# Patient Record
Sex: FEMALE | Race: BLACK OR AFRICAN AMERICAN | NOT HISPANIC OR LATINO | Employment: FULL TIME | URBAN - METROPOLITAN AREA
[De-identification: names, ages, dates, MRNs, and addresses within clinical notes are randomized per-mention and may not be internally consistent; named-entity substitution may affect disease eponyms.]

---

## 2020-03-18 ENCOUNTER — HISTORICAL (OUTPATIENT)
Dept: RADIOLOGY | Facility: HOSPITAL | Age: 40
End: 2020-03-18

## 2021-03-22 ENCOUNTER — HISTORICAL (OUTPATIENT)
Dept: RADIOLOGY | Facility: HOSPITAL | Age: 41
End: 2021-03-22

## 2021-05-10 ENCOUNTER — HISTORICAL (OUTPATIENT)
Dept: RADIOLOGY | Facility: HOSPITAL | Age: 41
End: 2021-05-10

## 2021-06-04 ENCOUNTER — HISTORICAL (OUTPATIENT)
Dept: RADIOLOGY | Facility: HOSPITAL | Age: 41
End: 2021-06-04

## 2022-04-13 ENCOUNTER — HISTORICAL (OUTPATIENT)
Dept: RESPIRATORY THERAPY | Facility: HOSPITAL | Age: 42
End: 2022-04-13

## 2022-04-13 ENCOUNTER — HISTORICAL (OUTPATIENT)
Dept: ADMINISTRATIVE | Facility: HOSPITAL | Age: 42
End: 2022-04-13

## 2023-04-25 DIAGNOSIS — Z12.31 ENCOUNTER FOR SCREENING MAMMOGRAM FOR MALIGNANT NEOPLASM OF BREAST: Primary | ICD-10-CM

## 2023-04-26 ENCOUNTER — HOSPITAL ENCOUNTER (OUTPATIENT)
Dept: RADIOLOGY | Facility: HOSPITAL | Age: 43
Discharge: HOME | End: 2023-04-26
Attending: NURSE PRACTITIONER
Payer: MEDICAID

## 2023-04-26 DIAGNOSIS — Z12.31 ENCOUNTER FOR SCREENING MAMMOGRAM FOR MALIGNANT NEOPLASM OF BREAST: ICD-10-CM

## 2023-04-26 PROCEDURE — 77067 SCR MAMMO BI INCL CAD: CPT | Mod: TC

## 2023-04-26 PROCEDURE — 77067 SCR MAMMO BI INCL CAD: CPT | Mod: 26,,, | Performed by: RADIOLOGY

## 2023-04-26 PROCEDURE — 77067 MAMMO DIGITAL SCREENING BILAT WITH TOMO: ICD-10-PCS | Mod: 26,,, | Performed by: RADIOLOGY

## 2023-04-26 PROCEDURE — 77063 MAMMO DIGITAL SCREENING BILAT WITH TOMO: ICD-10-PCS | Mod: 26,,, | Performed by: RADIOLOGY

## 2023-04-26 PROCEDURE — 77063 BREAST TOMOSYNTHESIS BI: CPT | Mod: 26,,, | Performed by: RADIOLOGY

## 2025-04-23 ENCOUNTER — HOSPITAL ENCOUNTER (EMERGENCY)
Facility: HOSPITAL | Age: 45
Discharge: ANOTHER HOSPITAL | End: 2025-04-23
Attending: EMERGENCY MEDICINE
Payer: COMMERCIAL

## 2025-04-23 ENCOUNTER — HOSPITAL ENCOUNTER (INPATIENT)
Facility: HOSPITAL | Age: 45
LOS: 3 days | Discharge: HOME | DRG: 378 | End: 2025-04-26
Attending: INTERNAL MEDICINE | Admitting: INTERNAL MEDICINE
Payer: COMMERCIAL

## 2025-04-23 VITALS
SYSTOLIC BLOOD PRESSURE: 138 MMHG | BODY MASS INDEX: 51.83 KG/M2 | DIASTOLIC BLOOD PRESSURE: 90 MMHG | WEIGHT: 264 LBS | TEMPERATURE: 99 F | OXYGEN SATURATION: 99 % | HEIGHT: 60 IN | HEART RATE: 94 BPM | RESPIRATION RATE: 22 BRPM

## 2025-04-23 DIAGNOSIS — R06.09 DYSPNEA ON EXERTION: ICD-10-CM

## 2025-04-23 DIAGNOSIS — K92.2 GIB (GASTROINTESTINAL BLEEDING): ICD-10-CM

## 2025-04-23 DIAGNOSIS — K92.2 UGIB (UPPER GASTROINTESTINAL BLEED): Primary | ICD-10-CM

## 2025-04-23 DIAGNOSIS — D64.9 SYMPTOMATIC ANEMIA: ICD-10-CM

## 2025-04-23 DIAGNOSIS — R07.9 CHEST PAIN: ICD-10-CM

## 2025-04-23 LAB
ABO + RH BLD: NORMAL
ABO + RH BLD: NORMAL
ALBUMIN SERPL-MCNC: 3 G/DL (ref 3.5–5)
ALBUMIN/GLOB SERPL: 0.9 RATIO (ref 1.1–2)
ALP SERPL-CCNC: 85 UNIT/L (ref 40–150)
ALT SERPL-CCNC: 21 UNIT/L (ref 0–55)
ANION GAP SERPL CALC-SCNC: 4 MEQ/L
AST SERPL-CCNC: 15 UNIT/L (ref 11–45)
BASOPHILS # BLD AUTO: 0.03 X10(3)/MCL
BASOPHILS NFR BLD AUTO: 0.3 %
BILIRUB SERPL-MCNC: 0.2 MG/DL
BLD PROD TYP BPU: NORMAL
BLD PROD TYP BPU: NORMAL
BLOOD UNIT EXPIRATION DATE: NORMAL
BLOOD UNIT EXPIRATION DATE: NORMAL
BLOOD UNIT TYPE CODE: 9500
BLOOD UNIT TYPE CODE: 9500
BUN SERPL-MCNC: 19.5 MG/DL (ref 7–18.7)
CALCIUM SERPL-MCNC: 8.7 MG/DL (ref 8.4–10.2)
CHLORIDE SERPL-SCNC: 107 MMOL/L (ref 98–107)
CO2 SERPL-SCNC: 28 MMOL/L (ref 22–29)
CREAT SERPL-MCNC: 0.86 MG/DL (ref 0.55–1.02)
CREAT/UREA NIT SERPL: 23
CROSSMATCH INTERPRETATION: NORMAL
CROSSMATCH INTERPRETATION: NORMAL
D DIMER PPP IA.FEU-MCNC: 0.19 UG/ML FEU (ref 0–0.5)
DISPENSE STATUS: NORMAL
DISPENSE STATUS: NORMAL
EOSINOPHIL # BLD AUTO: 0.22 X10(3)/MCL (ref 0–0.9)
EOSINOPHIL NFR BLD AUTO: 2 %
ERYTHROCYTE [DISTWIDTH] IN BLOOD BY AUTOMATED COUNT: 17 % (ref 11.5–17)
FERRITIN SERPL-MCNC: 83.4 NG/ML (ref 4.63–204)
FOLATE SERPL-MCNC: 10.6 NG/ML (ref 7–31.4)
GFR SERPLBLD CREATININE-BSD FMLA CKD-EPI: >60 ML/MIN/1.73/M2
GLOBULIN SER-MCNC: 3.4 GM/DL (ref 2.4–3.5)
GLUCOSE SERPL-MCNC: 119 MG/DL (ref 74–100)
GROUP & RH: NORMAL
GROUP & RH: NORMAL
HCT VFR BLD AUTO: 18.5 % (ref 37–47)
HEMOCCULT SP1 STL QL: POSITIVE
HGB BLD-MCNC: 5.6 G/DL (ref 12–16)
IMM GRANULOCYTES # BLD AUTO: 0.13 X10(3)/MCL (ref 0–0.04)
IMM GRANULOCYTES NFR BLD AUTO: 1.2 %
INDIRECT COOMBS: NORMAL
INDIRECT COOMBS: NORMAL
INR PPP: 1
IRON SATN MFR SERPL: 11 % (ref 20–50)
IRON SERPL-MCNC: 30 UG/DL (ref 50–170)
LYMPHOCYTES # BLD AUTO: 2.84 X10(3)/MCL (ref 0.6–4.6)
LYMPHOCYTES NFR BLD AUTO: 25.3 %
MCH RBC QN AUTO: 26.7 PG (ref 27–31)
MCHC RBC AUTO-ENTMCNC: 30.3 G/DL (ref 33–36)
MCV RBC AUTO: 88.1 FL (ref 80–94)
MONOCYTES # BLD AUTO: 0.5 X10(3)/MCL (ref 0.1–1.3)
MONOCYTES NFR BLD AUTO: 4.5 %
NEUTROPHILS # BLD AUTO: 7.51 X10(3)/MCL (ref 2.1–9.2)
NEUTROPHILS NFR BLD AUTO: 66.7 %
PLATELET # BLD AUTO: 379 X10(3)/MCL (ref 130–400)
PMV BLD AUTO: 10.2 FL (ref 7.4–10.4)
POTASSIUM SERPL-SCNC: 3.7 MMOL/L (ref 3.5–5.1)
PROT SERPL-MCNC: 6.4 GM/DL (ref 6.4–8.3)
PROTHROMBIN TIME: <10 SECONDS (ref 12.5–14.5)
RBC # BLD AUTO: 2.1 X10(6)/MCL (ref 4.2–5.4)
SODIUM SERPL-SCNC: 139 MMOL/L (ref 136–145)
SPECIMEN OUTDATE: NORMAL
SPECIMEN OUTDATE: NORMAL
TIBC SERPL-MCNC: 248 UG/DL (ref 70–310)
TIBC SERPL-MCNC: 278 UG/DL (ref 250–450)
TRANSFERRIN SERPL-MCNC: 248 MG/DL (ref 180–382)
TROPONIN I SERPL-MCNC: <0.01 NG/ML (ref 0–0.04)
TROPONIN I SERPL-MCNC: <0.01 NG/ML (ref 0–0.04)
UNIT NUMBER: NORMAL
UNIT NUMBER: NORMAL
VIT B12 SERPL-MCNC: 439 PG/ML (ref 213–816)
WBC # BLD AUTO: 11.23 X10(3)/MCL (ref 4.5–11.5)

## 2025-04-23 PROCEDURE — 93005 ELECTROCARDIOGRAM TRACING: CPT

## 2025-04-23 PROCEDURE — 82272 OCCULT BLD FECES 1-3 TESTS: CPT | Performed by: EMERGENCY MEDICINE

## 2025-04-23 PROCEDURE — 36415 COLL VENOUS BLD VENIPUNCTURE: CPT

## 2025-04-23 PROCEDURE — 83550 IRON BINDING TEST: CPT | Performed by: EMERGENCY MEDICINE

## 2025-04-23 PROCEDURE — 93010 ELECTROCARDIOGRAM REPORT: CPT | Mod: ,,, | Performed by: INTERNAL MEDICINE

## 2025-04-23 PROCEDURE — 86850 RBC ANTIBODY SCREEN: CPT | Mod: 91

## 2025-04-23 PROCEDURE — 85379 FIBRIN DEGRADATION QUANT: CPT | Performed by: EMERGENCY MEDICINE

## 2025-04-23 PROCEDURE — 85610 PROTHROMBIN TIME: CPT | Performed by: EMERGENCY MEDICINE

## 2025-04-23 PROCEDURE — 80053 COMPREHEN METABOLIC PANEL: CPT | Performed by: EMERGENCY MEDICINE

## 2025-04-23 PROCEDURE — 36430 TRANSFUSION BLD/BLD COMPNT: CPT

## 2025-04-23 PROCEDURE — 63600175 PHARM REV CODE 636 W HCPCS: Performed by: EMERGENCY MEDICINE

## 2025-04-23 PROCEDURE — 96374 THER/PROPH/DIAG INJ IV PUSH: CPT

## 2025-04-23 PROCEDURE — 86900 BLOOD TYPING SEROLOGIC ABO: CPT | Performed by: EMERGENCY MEDICINE

## 2025-04-23 PROCEDURE — P9016 RBC LEUKOCYTES REDUCED: HCPCS | Performed by: EMERGENCY MEDICINE

## 2025-04-23 PROCEDURE — 82607 VITAMIN B-12: CPT | Performed by: EMERGENCY MEDICINE

## 2025-04-23 PROCEDURE — 85025 COMPLETE CBC W/AUTO DIFF WBC: CPT | Performed by: EMERGENCY MEDICINE

## 2025-04-23 PROCEDURE — 25000003 PHARM REV CODE 250: Performed by: EMERGENCY MEDICINE

## 2025-04-23 PROCEDURE — 99285 EMERGENCY DEPT VISIT HI MDM: CPT | Mod: 25

## 2025-04-23 PROCEDURE — 63600175 PHARM REV CODE 636 W HCPCS

## 2025-04-23 PROCEDURE — 84484 ASSAY OF TROPONIN QUANT: CPT | Performed by: EMERGENCY MEDICINE

## 2025-04-23 PROCEDURE — 82728 ASSAY OF FERRITIN: CPT | Performed by: EMERGENCY MEDICINE

## 2025-04-23 PROCEDURE — 86923 COMPATIBILITY TEST ELECTRIC: CPT | Mod: 91 | Performed by: EMERGENCY MEDICINE

## 2025-04-23 PROCEDURE — 93010 ELECTROCARDIOGRAM REPORT: CPT | Mod: 76,,, | Performed by: INTERNAL MEDICINE

## 2025-04-23 PROCEDURE — 84484 ASSAY OF TROPONIN QUANT: CPT

## 2025-04-23 PROCEDURE — 11000001 HC ACUTE MED/SURG PRIVATE ROOM

## 2025-04-23 PROCEDURE — 82746 ASSAY OF FOLIC ACID SERUM: CPT | Performed by: EMERGENCY MEDICINE

## 2025-04-23 RX ORDER — ACETAMINOPHEN 500 MG
1000 TABLET ORAL
Status: COMPLETED | OUTPATIENT
Start: 2025-04-23 | End: 2025-04-23

## 2025-04-23 RX ORDER — PANTOPRAZOLE SODIUM 40 MG/10ML
80 INJECTION, POWDER, LYOPHILIZED, FOR SOLUTION INTRAVENOUS
Status: COMPLETED | OUTPATIENT
Start: 2025-04-23 | End: 2025-04-23

## 2025-04-23 RX ORDER — ALUMINUM HYDROXIDE, MAGNESIUM HYDROXIDE, AND SIMETHICONE 1200; 120; 1200 MG/30ML; MG/30ML; MG/30ML
30 SUSPENSION ORAL 4 TIMES DAILY PRN
Status: DISCONTINUED | OUTPATIENT
Start: 2025-04-23 | End: 2025-04-26 | Stop reason: HOSPADM

## 2025-04-23 RX ORDER — TALC
6 POWDER (GRAM) TOPICAL NIGHTLY PRN
Status: DISCONTINUED | OUTPATIENT
Start: 2025-04-23 | End: 2025-04-26 | Stop reason: HOSPADM

## 2025-04-23 RX ORDER — SODIUM CHLORIDE 0.9 % (FLUSH) 0.9 %
10 SYRINGE (ML) INJECTION
Status: DISCONTINUED | OUTPATIENT
Start: 2025-04-23 | End: 2025-04-26 | Stop reason: HOSPADM

## 2025-04-23 RX ORDER — PANTOPRAZOLE SODIUM 40 MG/10ML
40 INJECTION, POWDER, LYOPHILIZED, FOR SOLUTION INTRAVENOUS 2 TIMES DAILY
Status: DISCONTINUED | OUTPATIENT
Start: 2025-04-24 | End: 2025-04-26 | Stop reason: HOSPADM

## 2025-04-23 RX ORDER — NEBIVOLOL 2.5 MG/1
10 TABLET ORAL DAILY
COMMUNITY

## 2025-04-23 RX ORDER — IBUPROFEN 200 MG
24 TABLET ORAL
Status: DISCONTINUED | OUTPATIENT
Start: 2025-04-23 | End: 2025-04-26 | Stop reason: HOSPADM

## 2025-04-23 RX ORDER — ASPIRIN 325 MG
325 TABLET ORAL
Status: COMPLETED | OUTPATIENT
Start: 2025-04-23 | End: 2025-04-23

## 2025-04-23 RX ORDER — HYDROCODONE BITARTRATE AND ACETAMINOPHEN 500; 5 MG/1; MG/1
TABLET ORAL
Status: DISCONTINUED | OUTPATIENT
Start: 2025-04-23 | End: 2025-04-23 | Stop reason: HOSPADM

## 2025-04-23 RX ORDER — ACETAMINOPHEN 325 MG/1
650 TABLET ORAL EVERY 4 HOURS PRN
Status: DISCONTINUED | OUTPATIENT
Start: 2025-04-23 | End: 2025-04-26 | Stop reason: HOSPADM

## 2025-04-23 RX ORDER — LOSARTAN POTASSIUM 25 MG/1
12.5 TABLET ORAL DAILY
COMMUNITY

## 2025-04-23 RX ORDER — DIPHENHYDRAMINE HCL 25 MG
50 CAPSULE ORAL
Status: COMPLETED | OUTPATIENT
Start: 2025-04-23 | End: 2025-04-23

## 2025-04-23 RX ORDER — POLYETHYLENE GLYCOL 3350 17 G/17G
17 POWDER, FOR SOLUTION ORAL 2 TIMES DAILY PRN
Status: DISCONTINUED | OUTPATIENT
Start: 2025-04-23 | End: 2025-04-26 | Stop reason: HOSPADM

## 2025-04-23 RX ORDER — GLUCAGON 1 MG
1 KIT INJECTION
Status: DISCONTINUED | OUTPATIENT
Start: 2025-04-23 | End: 2025-04-26 | Stop reason: HOSPADM

## 2025-04-23 RX ORDER — AMLODIPINE BESYLATE 10 MG/1
10 TABLET ORAL DAILY
COMMUNITY

## 2025-04-23 RX ORDER — IBUPROFEN 200 MG
16 TABLET ORAL
Status: DISCONTINUED | OUTPATIENT
Start: 2025-04-23 | End: 2025-04-26 | Stop reason: HOSPADM

## 2025-04-23 RX ORDER — BISACODYL 10 MG/1
10 SUPPOSITORY RECTAL DAILY PRN
Status: DISCONTINUED | OUTPATIENT
Start: 2025-04-23 | End: 2025-04-26 | Stop reason: HOSPADM

## 2025-04-23 RX ORDER — FAMOTIDINE 10 MG/ML
20 INJECTION, SOLUTION INTRAVENOUS ONCE
Status: COMPLETED | OUTPATIENT
Start: 2025-04-23 | End: 2025-04-23

## 2025-04-23 RX ADMIN — ASPIRIN 325 MG ORAL TABLET 325 MG: 325 PILL ORAL at 09:04

## 2025-04-23 RX ADMIN — SODIUM CHLORIDE: 9 INJECTION, SOLUTION INTRAVENOUS at 12:04

## 2025-04-23 RX ADMIN — DIPHENHYDRAMINE HYDROCHLORIDE 50 MG: 25 CAPSULE ORAL at 10:04

## 2025-04-23 RX ADMIN — PANTOPRAZOLE SODIUM 80 MG: 40 INJECTION, POWDER, LYOPHILIZED, FOR SOLUTION INTRAVENOUS at 10:04

## 2025-04-23 RX ADMIN — FAMOTIDINE 20 MG: 10 INJECTION, SOLUTION INTRAVENOUS at 09:04

## 2025-04-23 RX ADMIN — ACETAMINOPHEN 1000 MG: 500 TABLET ORAL at 10:04

## 2025-04-23 NOTE — ED NOTES
Pt reports no s/s of transfusion reaction, denies urticaria, HA, sob, lower back pain, increased temp. Rate changed to 150mL/hr

## 2025-04-23 NOTE — ED NOTES
2nd unit of blood transfusion complete, pt denies any s/s of blood transfusion reaction, awaiting [facility] for tranport to [facility].

## 2025-04-23 NOTE — ED NOTES
2nd unit of Blood started at 75ml/hr. Pt in no distress, pt sitting up in bed talking on phone, vitals stable.

## 2025-04-23 NOTE — ED NOTES
Pt notified of critical H/H, after further questioning pt admitted to having dark tarry stools that started 1 week ago, MD made aware.

## 2025-04-23 NOTE — ED PROVIDER NOTES
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 The patient reports a two-week history of progressively worsening dyspnea, particularly during activities.    The patient states that her shortness of breath has significantly worsened over the past week, with yesterday being particularly bad. She used nebulized treatments, which provided temporary relief. This morning, she found it difficult to walk due to breathlessness. The patient describes this as the most severe shortness of breath she has ever experienced. She also reports occasional chest pain with pressure, which she distinguishes from her usual asthma symptoms.    The patient mentions having a bad backache two weeks ago, which preceded the onset of her current respiratory symptoms. She notes that her shortness of breath is now present even at rest and has worsened this week. The patient also reports possible recent cold or COVID-like symptoms, which may have exacerbated her respiratory issues.    The patient reports a change in stool character over the past week, with dark-colored stools that are not typical of her normal pattern. She denies recent iron or Pepto-Bismol ingestion and has no associated abdominal pain.    The patient's medical history is  "significant for multiple surgeries, including  sections, ectopic pregnancy, hysterectomy, cholecystectomy, and tonsillectomy with adenoidectomy. She has a family history of cardiovascular disease, noting that her brother had a heart attack four years ago, initially misdiagnosed as acid reflux.    The patient reports being 5'7" and weighing 260 lbs, with slight weight fluctuations.  she has a history of hypertension and takes losartan, Bystolic and amlodipine. She has been diagnosed with sleep apnea but admits to not using her CPAP regularly for several months.    The patient denies any rectal bleeding or hemoptysis.   Recent lifestyle factors include a busy schedule with graduation and prom activities, as well as driving family members around, which may be contributing to her fatigue and symptoms.          Review of patient's allergies indicates:  No Known Allergies  No past medical history on file.  No past surgical history on file.  No family history on file.  Social History[1]  Review of Systems   Constitutional:  Positive for fatigue. Negative for fever.   HENT: Negative.     Respiratory:  Positive for shortness of breath. Negative for wheezing.    Cardiovascular:  Positive for chest pain.   Gastrointestinal:  Negative for abdominal pain and vomiting.        Dark stool   Endocrine: Negative.    Genitourinary: Negative.    Musculoskeletal: Negative.    Skin: Negative.    Neurological:  Positive for weakness and headaches. Negative for syncope.   Hematological: Negative.    Psychiatric/Behavioral: Negative.     All other systems reviewed and are negative.      Physical Exam     Initial Vitals [25 0808]   BP Pulse Resp Temp SpO2   (!) 153/90 96 18 97.9 °F (36.6 °C) 97 %      MAP       --         Physical Exam    Nursing note and vitals reviewed.  Constitutional: She appears well-developed and well-nourished. No distress.   HENT:   Head: Normocephalic and atraumatic.   Eyes: EOM are normal. Pupils are " equal, round, and reactive to light.   Pale conjunctiva   Neck: Trachea normal. Neck supple.    Full passive range of motion without pain.     Cardiovascular:  Normal rate, regular rhythm and normal pulses.           Poor capillary refill   Pulmonary/Chest: Breath sounds normal.   Abdominal: Abdomen is soft.   Musculoskeletal:      Cervical back: Full passive range of motion without pain and neck supple.      Comments: No deformity, Nl ROM     Lymphadenopathy:     She has no cervical adenopathy.   Neurological: She is alert and oriented to person, place, and time. She has normal strength. GCS eye subscore is 4. GCS verbal subscore is 5. GCS motor subscore is 6.   Skin: Skin is warm and intact. Capillary refill takes less than 2 seconds.   Psychiatric: She is not actively hallucinating. She expresses no homicidal and no suicidal ideation.         ED Course   Critical Care    Date/Time: 4/23/2025 11:05 AM    Performed by: Yfn Keenan MD  Authorized by: Yfn Keenan MD  Total critical care time (exclusive of procedural time) : 0 minutes  Comments: Total critical care time is 45 minutes.     Total critical care time documented does not include time spent on separately billed procedures or the services of nurses, or mid-level providers.     I personally saw and examined the patient. I have reviewed all diagnostic interpretations and treatment plans as written. I was present for the key portions of any procedures performed and the inclusive time noted in any critical care statement. Critical care time includes patient management by me, time spent at the patient's bedside, time to review lab and imaging results, discussing patient care, documentation in the medical record, and time spent with the family or caregiver.    The high probability of sudden, clinically significant deterioration in the patient's condition required the highest level of preparedness to intervene urgently.          Labs Reviewed    COMPREHENSIVE METABOLIC PANEL - Abnormal       Result Value    Sodium 139      Potassium 3.7      Chloride 107      CO2 28      Glucose 119 (*)     Blood Urea Nitrogen 19.5 (*)     Creatinine 0.86      Calcium 8.7      Protein Total 6.4      Albumin 3.0 (*)     Globulin 3.4      Albumin/Globulin Ratio 0.9 (*)     Bilirubin Total 0.2      ALP 85      ALT 21      AST 15      eGFR >60      Anion Gap 4.0      BUN/Creatinine Ratio 23     PROTIME-INR - Abnormal    PT <10.0 (*)     INR 1.0      Narrative:     Protimes are used to monitor anticoagulant agents such as warfarin. PT INR values are based on the current patient normal mean and the CHANDRAKANT value for the specific instrument reagent used.  **Routine theraputic target values for the INR are 2.0-3.0**   CBC WITH DIFFERENTIAL - Abnormal    WBC 11.23      RBC 2.10 (*)     Hgb 5.6 (*)     Hct 18.5 (*)     MCV 88.1      MCH 26.7 (*)     MCHC 30.3 (*)     RDW 17.0      Platelet 379      MPV 10.2      Neut % 66.7      Lymph % 25.3      Mono % 4.5      Eos % 2.0      Basophil % 0.3      Imm Grans % 1.2      Neut # 7.51      Lymph # 2.84      Mono # 0.50      Eos # 0.22      Baso # 0.03      Imm Gran # 0.13 (*)    OCCULT BLOOD, STOOL 1ST SPECIMEN - Abnormal    Occult Blood Stool 1 Positive (*)    IRON AND TIBC - Abnormal    Iron Binding Capacity Unsaturated 248      Iron Level 30 (*)     Transferrin 248      Iron Binding Capacity Total 278      Iron Saturation 11 (*)    TROPONIN I - Normal    Troponin-I <0.010     D DIMER, QUANTITATIVE - Normal    D-Dimer 0.19     FERRITIN - Normal    Ferritin Level 83.40     VITAMIN B12 - Normal    Vitamin B12 439     FOLATE - Normal    Folate Level 10.6     CBC W/ AUTO DIFFERENTIAL    Narrative:     The following orders were created for panel order CBC auto differential.  Procedure                               Abnormality         Status                     ---------                               -----------         ------                      CBC with Differential[[id number]]        Abnormal            Final result                 Please view results for these tests on the individual orders.   TYPE & SCREEN    Group & Rh O NEG      Indirect Abhinav GEL NEG      Specimen Outdate 04/26/2025 23:59     PREPARE RBC SOFT    UNIT NUMBER [id number]      UNIT ABO/RH O NEG      DISPENSE STATUS Transfused      Unit Expiration [id number]      Product Code F2952V43      Unit Blood Type Code 9500      CROSSMATCH INTERPRETATION Compatible      UNIT NUMBER [id number]      UNIT ABO/RH O NEG      DISPENSE STATUS Transfused      Unit Expiration [id number]      Product Code Y2740J93      Unit Blood Type Code 9500      CROSSMATCH INTERPRETATION Compatible       EKG Readings: (Independently Interpreted)   Rhythm: Sinus Tachycardia. Ectopy: No Ectopy. Conduction: Normal. ST Segments: Normal ST Segments. T Waves: Normal. Clinical Impression: Normal Sinus Rhythm     ECG Results              EKG 12-lead (Final result)        Collection Time Result Time QRS Duration OHS QTC Calculation    04/23/25 08:03:40 04/24/25 12:39:46 84 457                     Final result by Interface, Lab In [facility] (04/24/25 12:39:52)                   Narrative:    Test Reason : R07.9,    Vent. Rate : 104 BPM     Atrial Rate : 104 BPM     P-R Int : 158 ms          QRS Dur :  84 ms      QT Int : 348 ms       P-R-T Axes :  37  69  34 degrees    QTcB Int : 457 ms    Sinus tachycardia  Otherwise normal ECG  When compared with ECG of 23-Apr-2025 08:02,  MANUAL COMPARISON REQUIRED PREVIOUS ECG IS INCOMPATIBLE  Confirmed by Portillo Person ([id number]) on 4/24/2025 12:39:45 PM    Referred By: AAAREFERRAL SELF           Confirmed By: Portillo Person                                  Imaging Results              X-Ray Chest AP Portable (Final result)  Result time 04/23/25 09:42:40      Final result by Andrzej Becker MD (04/23/25 09:42:40)                   Impression:      No acute findings.      Electronically  signed by: Andrzej Becker  Date:    04/23/2025  Time:    09:42               Narrative:    EXAMINATION:  XR CHEST AP PORTABLE    CLINICAL HISTORY:  Chest Pain;    COMPARISON:  16 September 2021    FINDINGS:  Frontal view of the chest was obtained. Heart is mildly enlarged.  Grossly clear lungs.  No pneumothorax.                                       Medications   aspirin tablet 325 mg (325 mg Oral Given 4/23/25 0934)   acetaminophen tablet 1,000 mg (1,000 mg Oral Given 4/23/25 1042)   diphenhydrAMINE capsule 50 mg (50 mg Oral Given 4/23/25 1042)   pantoprazole injection 80 mg (80 mg Intravenous Given 4/23/25 1059)     Medical Decision Making  Medical Decision Making  45-year-old female with a history of asthma, sleep apnea, and hypertension presenting with worsening shortness of breath for two weeks and occasional chest pressure. The patient's presentation with exertional dyspnea, chest pressure, and recent worsening of symptoms raises concern for symptomatic anemia with potential gastrointestinal etiology as the primary differential diagnosis. Given the family history of misdiagnosed heart attack, cardiac causes must still be thoroughly evaluated. The reported pallor and possibility of anemia are now considered the most likely underlying condition. Differential diagnoses now include:  - Symptomatic anemia with potential gastrointestinal etiology (primary concern)  - Acute coronary syndrome from obstructive coronary disease (high risk due to obesity and hypertension)  - Pulmonary embolism  - Exacerbation of asthma    Lab work, including cardiac enzymes, D-dimer, and complete blood count, along with possible CT angiography, will help differentiate between these conditions. A cardiology consult via telecardiology is planned to assist with disposition based on lab results and clinical presentation. The patient's recent possible viral illness (cold or COVID) could explain the worsening respiratory symptoms, but more  serious causes must be ruled out first. The decision to repeat cardiac enzymes in 2-3 hours reflects the need to monitor for evolving cardiac events.    Acute dyspnea with chest pain  Assessment: 45-year-old female with history of asthma presenting with acute worsening of dyspnea over the past two weeks, particularly exacerbated in the last 24 hours. Patient reports dyspnea at rest and with exertion, accompanied by occasional chest pressure. Symptoms are described as different from typical asthma exacerbations. Recent history of backache two weeks ago, followed by respiratory symptom progression. Patient has multiple risk factors for cardiopulmonary disease, including obesity (BMI ~40.7), hypertension, sleep apnea with poor CPAP compliance, and family history of premature coronary artery disease. Differential diagnoses include symptomatic anemia with potential GI etiology as primary concern, acute coronary syndrome from obstructive coronary disease (high risk), pulmonary embolism, heart failure exacerbation, and severe asthma exacerbation.  Plan:  - Obtain laboratory studies including cardiac enzymes, D-dimer, complete blood count  - Repeat cardiac enzymes in 2-3 hours  - Perform CT angiography if indicated based on D-dimer results  -  possible Cardiology consultation via telecardiology for disposition decision    Possible Symptomatic Anemia  Assessment: Patient presents with pallor of the eyes and conjunctiva, with poor capillary refill of nail beds, raising strong suspicion for symptomatic anemia. Patient reports change in stool character over past week, with dark-colored stools not typical of her normal pattern. No recent iron or Pepto-Bismol ingestion. No associated abdominal pain.  Plan:  - Comprehensive anemia workup including CBC, iron studies, and potential GI evaluation  - Assess for potential occult bleeding source, performed digital rectal exam    Hypertension  Assessment: Patient reports blood pressure  "of 130/80 mmHg, currently managed with three antihypertensive medications including Bystolic (taken at night).  Plan:  - Continue current antihypertensive regimen, including Bystolic at night    Obesity  Assessment: Patient reports height of 5'7" and weight of 260 lbs, corresponding to a BMI of approximately 40.7, indicating class III obesity.    Sleep apnea  Assessment: Patient has diagnosed sleep apnea but reports poor compliance with CPAP therapy, not using the device regularly for several months.    Amount and/or Complexity of Data Reviewed  Labs: ordered.  Radiology: ordered.    Risk  OTC drugs.  Prescription drug management.      Additional MDM:   Heart Score:    History:          Slightly suspicious.  ECG:             Normal  Age:               45-65 years  Risk factors: >= 3 risk factors or history of atherosclerotic disease  Troponin:       Less than or equal to normal limit  Heart Score = 3          NICOLASA Score:   Age over 65:                                    0.00   > or = to 3 CAD risk factors:           1.00  Established CAD:                            0.00  > or = to 2 anginal events in the past 24 hours: 1.00  Use of ASA in past 7 days:              0.00  Elevated Enzymes:                         0.00  ST Depression > or = to 0.05 mV:  0.00  NICOLASA score = 2                  10:35 a.m.    Medical Decision Making  Patient presents with chest pain and severe anemia. Cardiac enzymes are within normal limits, ruling out acute coronary syndrome. The etiology of chest pain is identified as severe anemia, with CBC revealing a hemoglobin of 5.6 and hematocrit of 18.5, consistent with physical exam findings. Digital rectal exam is heme positive, indicating gastrointestinal bleeding. Low total iron and iron saturation further support the diagnosis of iron deficiency anemia. CT angiogram was initially considered but cancelled due to the identified etiology. Cardiology consultation was deemed unnecessary given the " clear cause of symptoms. The severity of anemia and presence of GI bleeding necessitate immediate intervention with blood transfusion for symptomatic anemia and further workup for upper GI bleeding.    Severe Symptomatic Anemia  Assessment: Patient presents with severe symptomatic anemia, confirmed by CBC revealing hemoglobin of 5.6 g/dL and hematocrit of 18.5%. Physical exam findings are consistent with these lab results. Digital rectal exam is heme positive, indicating gastrointestinal bleeding. Low total iron and iron saturation levels are noted. Cardiac enzymes are within normal limits, ruling out cardiac etiology for the patient's symptoms.  Plan:  - Transfuse patient for symptomatic anemia  - Transfer to a higher level of care for gastrointestinal workup  - Perform upper GI endoscopy after transfer          Total critical care time is 45 minutes.      Total critical care time documented does not include time spent on separately billed procedures or the services of nurses, or mid-level providers.      I personally saw and examined the patient. I have reviewed all diagnostic interpretations and treatment plans as written. I was present for the key portions of any procedures performed and the inclusive time noted in any critical care statement. Critical care time includes patient management by me, time spent at the patient's bedside, time to review lab and imaging results, discussing patient care, documentation in the medical record, and time spent with the family or caregiver.     The high probability of sudden, clinically significant deterioration in the patient's condition required the highest level of preparedness to intervene urgently.           Clinical Impression:  Final diagnoses:  [R07.9] Chest pain  [K92.2] UGIB (upper gastrointestinal bleed) (Primary)  [D64.9] Symptomatic anemia  [R06.09] Dyspnea on exertion          ED Disposition Condition    Transfer to Another Facility Fair                    [1]          Yfn Keenan MD  04/25/25 0599

## 2025-04-23 NOTE — PROVIDER TRANSFER
Outside Transfer Acceptance Note / Regional Referral Center    Referring facility: [facility]   Referring provider: TAVIA LUGO  Accepting facility: [facility]  Accepting provider: CARMITA LIMA SHAMEEM  Admitting provider: Raul  Reason for transfer: Higher Level of Care   Transfer diagnosis: Acute upper GI bleeding  Transfer specialty requested: Gastroenterology  Transfer specialty notified: No  Transfer level: NUMBER 1-5: 2  Bed type requested: med/surg  Isolation status: No active isolations   Admission class or status: IP- Inpatient      Narrative     45-year-old female currently at [facility] emergency room. Requesting transfer for gastroenterology services. Patient originally reported with complaint of chest pain and dyspnea. Reports decreased exercise tolerance. History of asthma, sleep apnea, and hypertension. In the emergency room she is noted to be mildly hypertensive but overall stable. Mild tachypnea but stable on room air. Noted to be pale on exam. Hemoglobin returned at 5.6. Electrolytes and renal function are stable. Iron level 30 with saturation of 11%. Transfusing 2 units of packed red blood cells in the emergency room. Started on IV Protonix. EKG sinus rhythm without ST-T changes. Troponin negative. Chest x-ray clear. Digital rectal exam was heme positive. Will be transferred under hospitalist services     Objective     Vitals:    Recent Labs: All pertinent labs within the past 24 hours have been reviewed.  Recent Lab Results         04/23/25  1025   04/23/25  0959   04/23/25  0902        Unit Blood Type Code 9500  [P]            9500  [P]           Unit Expiration [id number]  [P]            [id number]  [P]           Albumin/Globulin Ratio     0.9       Albumin     3.0       ALP     85       ALT     21       Anion Gap     4.0       AST     15       Baso #     0.03       Basophil %     0.3       BILIRUBIN TOTAL     0.2        BUN     19.5       BUN/CREAT RATIO     23       Calcium     8.7       Chloride     107       CO2     28       Creatinine     0.86       Crossmatch Interpretation Compatible  [P]            Compatible  [P]           D-Dimer 0.19  Comment: D-dimer values of less than 0.5ug/mL (mg/L) FEU in adult patients with a clinically low pre-test probability of developing a DVT yield  a 99% negative predictive value.  D-dimer increases naturally with age, therefore the negative predictive value in patients older than 80 is 21 - 31%.    D-Dimer testing is used as an aid in the diagnosis of VTE/PE. The D-Dimer test must be used with one or more additional tests such as imaging studies to evaluate VTE/PE           DISPENSE STATUS Selected  [P]            Issued  [P]           eGFR     >60  Comment: Estimated GFR calculated using the CKD-EPI creatinine (2021) equation.       Eos #     0.22       Eos %     2.0       Ferritin     83.40       Folate 10.6           Globulin, Total     3.4       Glucose     119       Group & Rh O NEG           Hematocrit     18.5       Hemoglobin     5.6       Immature Grans (Abs)     0.13       Immature Granulocytes     1.2       Indirect Abhinav GEL NEG           INR     1.0       Iron     30       Iron Saturation     11       Lymph #     2.84       LYMPH %     25.3       MCH     26.7       MCHC     30.3       MCV     88.1       Mono #     0.50       Mono %     4.5       MPV     10.2       Neut #     7.51       Neut %     66.7       Occult Blood   Positive         Platelet Count     379       Potassium     3.7       Product Code S5501L66  [P]            [id number]  [P]           PROTEIN TOTAL     6.4       PT     <10.0       RBC     2.10       RDW     17.0       Sodium     139       Specimen Outdate 04/26/2025 23:59           TIBC     278       Transferrin     248       Troponin I     <0.010       UIBC     248       Unit ABO/Rh O NEG  [P]            O NEG  [P]           UNIT NUMBER [id number]  [P]             [id number]  [P]           Vitamin B12     439       WBC     11.23                [P] - Preliminary Result             Recent imaging:   Airway:     Vent settings:         IV access:        Peripheral IV - Single Lumen 04/23/25 1046 20 G Anterior;Proximal;Right Forearm (Active)     Infusions: none  Allergies: Review of patient's allergies indicates:  No Known Allergies   NPO: Yes    Anticoagulation:   Anticoagulants       None               Ziggy Slater MD  04/23/2025 12:39 PM

## 2025-04-24 LAB
ALBUMIN SERPL-MCNC: 3.1 G/DL (ref 3.5–5)
ALBUMIN/GLOB SERPL: 1 RATIO (ref 1.1–2)
ALP SERPL-CCNC: 85 UNIT/L (ref 40–150)
ALT SERPL-CCNC: 21 UNIT/L (ref 0–55)
ANION GAP SERPL CALC-SCNC: 7 MEQ/L
AST SERPL-CCNC: 22 UNIT/L (ref 11–45)
BASOPHILS # BLD AUTO: 0.05 X10(3)/MCL
BASOPHILS NFR BLD AUTO: 0.6 %
BILIRUB SERPL-MCNC: 0.5 MG/DL
BUN SERPL-MCNC: 15.1 MG/DL (ref 7–18.7)
CALCIUM SERPL-MCNC: 8.1 MG/DL (ref 8.4–10.2)
CHLORIDE SERPL-SCNC: 105 MMOL/L (ref 98–107)
CO2 SERPL-SCNC: 25 MMOL/L (ref 22–29)
CREAT SERPL-MCNC: 1.01 MG/DL (ref 0.55–1.02)
CREAT/UREA NIT SERPL: 15
EOSINOPHIL # BLD AUTO: 0.19 X10(3)/MCL (ref 0–0.9)
EOSINOPHIL NFR BLD AUTO: 2.4 %
ERYTHROCYTE [DISTWIDTH] IN BLOOD BY AUTOMATED COUNT: 16.7 % (ref 11.5–17)
GFR SERPLBLD CREATININE-BSD FMLA CKD-EPI: >60 ML/MIN/1.73/M2
GLOBULIN SER-MCNC: 3 GM/DL (ref 2.4–3.5)
GLUCOSE SERPL-MCNC: 130 MG/DL (ref 74–100)
HCT VFR BLD AUTO: 23.7 % (ref 37–47)
HGB BLD-MCNC: 7.2 G/DL (ref 12–16)
IMM GRANULOCYTES # BLD AUTO: 0.08 X10(3)/MCL (ref 0–0.04)
IMM GRANULOCYTES NFR BLD AUTO: 1 %
LYMPHOCYTES # BLD AUTO: 2.57 X10(3)/MCL (ref 0.6–4.6)
LYMPHOCYTES NFR BLD AUTO: 31.9 %
MAGNESIUM SERPL-MCNC: 2 MG/DL (ref 1.6–2.6)
MCH RBC QN AUTO: 26.7 PG (ref 27–31)
MCHC RBC AUTO-ENTMCNC: 30.4 G/DL (ref 33–36)
MCV RBC AUTO: 87.8 FL (ref 80–94)
MONOCYTES # BLD AUTO: 0.43 X10(3)/MCL (ref 0.1–1.3)
MONOCYTES NFR BLD AUTO: 5.3 %
NEUTROPHILS # BLD AUTO: 4.74 X10(3)/MCL (ref 2.1–9.2)
NEUTROPHILS NFR BLD AUTO: 58.8 %
NRBC BLD AUTO-RTO: 0.2 %
OHS QRS DURATION: 82 MS
OHS QRS DURATION: 84 MS
OHS QTC CALCULATION: 454 MS
OHS QTC CALCULATION: 457 MS
PLATELET # BLD AUTO: 347 X10(3)/MCL (ref 130–400)
PMV BLD AUTO: 10 FL (ref 7.4–10.4)
POTASSIUM SERPL-SCNC: 3.8 MMOL/L (ref 3.5–5.1)
PROT SERPL-MCNC: 6.1 GM/DL (ref 6.4–8.3)
RBC # BLD AUTO: 2.7 X10(6)/MCL (ref 4.2–5.4)
SODIUM SERPL-SCNC: 137 MMOL/L (ref 136–145)
TROPONIN I SERPL-MCNC: <0.01 NG/ML (ref 0–0.04)
TROPONIN I SERPL-MCNC: <0.01 NG/ML (ref 0–0.04)
WBC # BLD AUTO: 8.06 X10(3)/MCL (ref 4.5–11.5)

## 2025-04-24 PROCEDURE — 80053 COMPREHEN METABOLIC PANEL: CPT

## 2025-04-24 PROCEDURE — 25000003 PHARM REV CODE 250: Performed by: INTERNAL MEDICINE

## 2025-04-24 PROCEDURE — 11000001 HC ACUTE MED/SURG PRIVATE ROOM

## 2025-04-24 PROCEDURE — 36415 COLL VENOUS BLD VENIPUNCTURE: CPT

## 2025-04-24 PROCEDURE — 83735 ASSAY OF MAGNESIUM: CPT

## 2025-04-24 PROCEDURE — 85025 COMPLETE CBC W/AUTO DIFF WBC: CPT

## 2025-04-24 PROCEDURE — 63600175 PHARM REV CODE 636 W HCPCS

## 2025-04-24 PROCEDURE — 84484 ASSAY OF TROPONIN QUANT: CPT

## 2025-04-24 RX ORDER — SODIUM, POTASSIUM,MAG SULFATES 17.5-3.13G
180 SOLUTION, RECONSTITUTED, ORAL ORAL 2 TIMES DAILY
Status: COMPLETED | OUTPATIENT
Start: 2025-04-24 | End: 2025-04-25

## 2025-04-24 RX ORDER — AMLODIPINE BESYLATE 5 MG/1
10 TABLET ORAL DAILY
Status: DISCONTINUED | OUTPATIENT
Start: 2025-04-24 | End: 2025-04-26 | Stop reason: HOSPADM

## 2025-04-24 RX ORDER — SODIUM, POTASSIUM,MAG SULFATES 17.5-3.13G
1 SOLUTION, RECONSTITUTED, ORAL ORAL ONCE
Status: DISCONTINUED | OUTPATIENT
Start: 2025-04-24 | End: 2025-04-24

## 2025-04-24 RX ORDER — METOPROLOL TARTRATE 25 MG/1
25 TABLET, FILM COATED ORAL 2 TIMES DAILY
Status: DISCONTINUED | OUTPATIENT
Start: 2025-04-24 | End: 2025-04-26 | Stop reason: HOSPADM

## 2025-04-24 RX ADMIN — PANTOPRAZOLE SODIUM 40 MG: 40 INJECTION, POWDER, FOR SOLUTION INTRAVENOUS at 08:04

## 2025-04-24 RX ADMIN — SODIUM SULFATE, POTASSIUM SULFATE, MAGNESIUM SULFATE 180 ML: 17.5; 3.13; 1.6 SOLUTION, CONCENTRATE ORAL at 05:04

## 2025-04-24 RX ADMIN — METOPROLOL TARTRATE 25 MG: 25 TABLET, FILM COATED ORAL at 08:04

## 2025-04-24 RX ADMIN — PANTOPRAZOLE SODIUM 40 MG: 40 INJECTION, POWDER, FOR SOLUTION INTRAVENOUS at 10:04

## 2025-04-24 RX ADMIN — METOPROLOL TARTRATE 25 MG: 25 TABLET, FILM COATED ORAL at 02:04

## 2025-04-24 NOTE — PLAN OF CARE
Problem: Adult Inpatient Plan of Care  Goal: Plan of Care Review  Outcome: Progressing  Flowsheets (Taken 4/24/2025 0800)  Plan of Care Reviewed With: patient  Goal: Patient-Specific Goal (Individualized)  Outcome: Progressing  Goal: Absence of Hospital-Acquired Illness or Injury  Outcome: Progressing  Intervention: Identify and Manage Fall Risk  Flowsheets (Taken 4/24/2025 0800)  Safety Promotion/Fall Prevention:   assistive device/personal item within reach   nonskid shoes/socks when out of bed   lighting adjusted   medications reviewed   room near unit station  Intervention: Prevent Skin Injury  Flowsheets (Taken 4/24/2025 0800)  Body Position: position changed independently  Skin Protection: incontinence pads utilized  Device Skin Pressure Protection: absorbent pad utilized/changed  Intervention: Prevent and Manage VTE (Venous Thromboembolism) Risk  Flowsheets (Taken 4/24/2025 0800)  VTE Prevention/Management: ROM (active) performed  Intervention: Prevent Infection  Flowsheets (Taken 4/24/2025 0800)  Infection Prevention: rest/sleep promoted  Goal: Optimal Comfort and Wellbeing  Outcome: Progressing  Intervention: Monitor Pain and Promote Comfort  Flowsheets (Taken 4/24/2025 0800)  Pain Management Interventions:   care clustered   medication offered  Intervention: Provide Person-Centered Care  Flowsheets (Taken 4/24/2025 0800)  Trust Relationship/Rapport:   care explained   reassurance provided   choices provided   thoughts/feelings acknowledged   emotional support provided   empathic listening provided   questions answered   questions encouraged  Goal: Readiness for Transition of Care  Outcome: Progressing     Problem: Bariatric Environmental Safety  Goal: Safety Maintained with Care  Outcome: Progressing

## 2025-04-24 NOTE — H&P
[facility]  Hospital Medicine History & Physical Examination       Patient Name: Min Desir  MRN: [id number]  Patient Class: IP- Inpatient   Admission Date: 4/23/2025   Admitting Physician: NATHAN Service   Length of Stay: 1  Attending Physician: Grzegorz Pinto MD  Primary Care Provider: Shantel Barbour NP  Face-to-Face encounter date: 04/24/2025  Code Status:FULL  Chief Complaint: No chief complaint on file.                  HISTORY OF PRESENT ILLNESS:   Min Desir is a 45 y.o. female who  has no past medical history on file..     45-year-old obese woman with medical history of HTN, SHANTANU, chronic asthma sent from outside facility for GI evaluation.  Patient was seen at outside facility due to complaints of shortness a breath on exertion, generalized weakness, lethargy and had no relief with the nebulizer treatments.  Workup outside revealed hemoglobin 5.6, for which she received 2 units of PRBC on transferred to [facility].  She was put on IV Protonix when seen at bedside she was alert, oriented, resting in the bed.  Uses Naprosyn periodically.  Hemoglobin 7.2 today after 2 units of PRBC      PAST MEDICAL HISTORY:   No past medical history on file.    PAST SURGICAL HISTORY:   No past surgical history on file.    ALLERGIES:   Patient has no known allergies.    FAMILY HISTORY:   Reviewed and negative    SOCIAL HISTORY:     Social History     Tobacco Use    Smoking status: Not on file    Smokeless tobacco: Not on file   Substance Use Topics    Alcohol use: Not on file        HOME MEDICATIONS:     Prior to Admission medications    Medication Sig Start Date End Date Taking? Authorizing Provider   amLODIPine (NORVASC) 10 MG tablet Take 10 mg by mouth once daily.   Yes Provider, Historical   losartan (COZAAR) 25 MG tablet Take 12.5 mg by mouth once daily.   Yes Provider, Historical   nebivoloL (BYSTOLIC) 2.5 MG Tab Take 10 mg by mouth once  daily.  Patient taking differently: Take 10 mg by mouth once daily. Patient unsure of dose    Provider, Historical       REVIEW OF SYSTEMS:   Except as documented, all other systems reviewed and negative     PHYSICAL EXAM:     VITAL SIGNS: 24 HRS MIN & MAX LAST   Temp  Min: 97.7 °F (36.5 °C)  Max: 98.6 °F (37 °C) 98.1 °F (36.7 °C)   BP  Min: 119/86  Max: 174/109 (!) 140/87   Pulse  Min: 86  Max: 105  94   Resp  Min: 14  Max: 22 18   SpO2  Min: 95 %  Max: 100 % 96 %     General appearance: Well-developed, well-nourished female in no apparent distress.  HENT: Atraumatic head. Moist mucous membranes of oral cavity.  Eyes: Normal extraocular movements.   Neck: Supple.   Lungs: Clear to auscultation bilaterally. No wheezing present.   Heart: Regular rate and rhythm. S1 and S2 present with no murmurs/gallop/rub. No pedal edema. No JVD present.   Abdomen: Soft, non-distended, non-tender. No rebound tenderness/guarding. Bowel sounds are normal.   Extremities: No cyanosis, clubbing, or edema.  Skin: No Rash.   Neuro: Motor and sensory exams grossly intact. Good tone. Muscle strength 5/5 in all 4 extremities  Psych/mental status: Appropriate mood and affect. Responds appropriately to questions.     LABS AND IMAGING:     Recent Labs   Lab 04/23/25  0902 04/24/25  0543   WBC 11.23 8.06   RBC 2.10* 2.70*   HGB 5.6* 7.2*   HCT 18.5* 23.7*   MCV 88.1 87.8   MCH 26.7* 26.7*   MCHC 30.3* 30.4*   RDW 17.0 16.7    347   MPV 10.2 10.0       Recent Labs   Lab 04/23/25  0902 04/24/25  0543    137   K 3.7 3.8    105   CO2 28 25   BUN 19.5* 15.1   CREATININE 0.86 1.01   CALCIUM 8.7 8.1*   MG  --  2.00   ALBUMIN 3.0* 3.1*   ALKPHOS 85 85   ALT 21 21   AST 15 22   BILITOT 0.2 0.5       Microbiology Results (last 7 days)       ** No results found for the last 168 hours. **             X-Ray Chest AP Portable  Narrative: EXAMINATION:  XR CHEST AP PORTABLE    CLINICAL HISTORY:  Chest Pain;    COMPARISON:  16 September  2021    FINDINGS:  Frontal view of the chest was obtained. Heart is mildly enlarged.  Grossly clear lungs.  No pneumothorax.  Impression: No acute findings.    Electronically signed by: Andrzej Becker  Date:    04/23/2025  Time:    09:42      ASSESSMENT & PLAN:     Melena  Acute on chronic anemia due to above s/p 2 units of PRBC   Symptomatic anemia due to blood loss   Iron-deficiency  NSAID use    HX: Chronic asthma, obesity, HTN, SHANTANU     Plan:   -continue Protonix.  Monitor hemoglobin and transfuse for less than 7   -GI consulted.  Plan for EGD and colonoscopy tomorrow  -add IV iron.  P.o. iron at DC   -will review the home medications     SCDs        __________________________________________________________________________  INPATIENT LIST OF MEDICATIONS   Current Medications[1]      Scheduled Meds:   pantoprazole  40 mg Intravenous BID    sodium,potassium,mag sulfates  180 mL Oral BID     Continuous Infusions:  PRN Meds:.  Current Facility-Administered Medications:     acetaminophen, 650 mg, Oral, Q4H PRN    aluminum-magnesium hydroxide-simethicone, 30 mL, Oral, QID PRN    bisacodyL, 10 mg, Rectal, Daily PRN    dextrose 50%, 12.5 g, Intravenous, PRN    dextrose 50%, 25 g, Intravenous, PRN    glucagon (human recombinant), 1 mg, Intramuscular, PRN    glucose, 16 g, Oral, PRN    glucose, 24 g, Oral, PRN    melatonin, 6 mg, Oral, Nightly PRN    polyethylene glycol, 17 g, Oral, BID PRN    sodium chloride 0.9%, 10 mL, Intravenous, PRN      Grzegorz Pinto MD   04/24/2025     Screening for Social Drivers for health:  Patient screened for food insecurity, housing instability, transportation needs, utility difficulties, and interpersonal safety (select all that apply as identified as concern)  []Housing or Food  []Transportation Needs  []Utility Difficulties  []Interpersonal safety  [x]None       [1]   Current Facility-Administered Medications:     acetaminophen tablet 650 mg, 650 mg, Oral, Q4H PRN, Nadja Byrnes,  FNP    aluminum-magnesium hydroxide-simethicone 200-200-20 mg/5 mL suspension 30 mL, 30 mL, Oral, QID PRN, Nadja Byrnes, JEWELP    bisacodyL suppository 10 mg, 10 mg, Rectal, Daily PRN, Nadja Byrnes, FNP    dextrose 50% injection 12.5 g, 12.5 g, Intravenous, PRN, hCristiano Byrnesyssa, FNP    dextrose 50% injection 25 g, 25 g, Intravenous, PRN, Donna Byrnessa, FNP    glucagon (human recombinant) injection 1 mg, 1 mg, Intramuscular, PRN, Donna Byrnessa, FNP    glucose chewable tablet 16 g, 16 g, Oral, PRN, Fitz, Nadja, FNP    glucose chewable tablet 24 g, 24 g, Oral, PRN, Fitz, Nadja, FNP    melatonin tablet 6 mg, 6 mg, Oral, Nightly PRN, Fitz Nadja, FNP    pantoprazole injection 40 mg, 40 mg, Intravenous, BID, Nadja Byrnes, JEWELP, 40 mg at 04/24/25 1032    polyethylene glycol packet 17 g, 17 g, Oral, BID PRN, Fitz Nadja, FNP    sodium chloride 0.9% flush 10 mL, 10 mL, Intravenous, PRN, Donna Byrnessa, FNP    sodium,potassium,mag sulfates 17.5-3.13-1.6 gram SolR 180 mL, 180 mL, Oral, BID, De Carter MD

## 2025-04-24 NOTE — CONSULTS
Reason for Disposition  • SEVERE leg swelling (e.g., swelling extends above knee, entire leg is swollen, weeping fluid)    Protocols used: LEG SWELLING AND EDEMA-A-AH     Consult Note    Reason for Consult:      We were consulted by Nadja Byrnes NP to evaluate this patient for GIB, +occult.       HPI:     45-year-old female unknown to our group (primary GI is Dr. Plascencia) with a past medical history of anemia, as HTN, sleep apnea and HLD who presented to [facility] 4/23/25 has a transfer from [facility] for gastroenterology services.  Patient originally presented there complaining of chest pain and dyspnea.    On arrival to the ER, patient mildly hypertensive and tachycardic but otherwise afebrile and oxygenating well on room air.  Labs notable for H&H 5.6/18.5, iron 30, iron sat 11%.  Stool positive for occult blood.  She was transfused 2 units PRBCs, started on IV pantoprazole, and transferred to [facility] where she was admitted to hospital medicine services for further evaluation and management.    GI has been consulted for GI bleed, positive occult blood.  Today, patient seen resting comfortably in bed.  She is afebrile and HDS.  She denies any abdominal pain, nausea, vomiting, or heartburn/reflux at present.  Admits to dark/black stools that began last week.  She is not currently on oral iron or recently taken Pepto-Bismol.  She also reports increase in constipation episodes over the last 2 months.  In the past, she admits to occasional mild constipation; however, approximately 1 month ago, she had an episode that lasted about a week and a half with associated upper abdominal bloating and discomfort.  Her last bowel movement was earlier this morning which she describes as formed and black.  Her last colonoscopy was 2-3 years ago and was reportedly normal.   She did have H pylori in 2021 and was successfully treated.  No significant history of acid reflux/heartburn - reports taking OTC Pepcid occasionally for heartburn due to something she ate.  No changes in appetite or unintentional weight loss.  Denies regular NSAID use, admits to taking naproxen occasionally.  Denies  alcohol, tobacco, or illicit drug use.  No family history of colorectal cancer.      PCP:  Shantel Barbour NP    Review of patient's allergies indicates:  No Known Allergies     Current Medications[1]  Prescriptions Prior to Admission[2]    Past Medical History:  No past medical history on file.   Past Surgical History:  No past surgical history on file.   Family History:  No family history on file.  Social History:  Social History     Tobacco Use    Smoking status: Not on file    Smokeless tobacco: Not on file   Substance Use Topics    Alcohol use: Not on file       Review of Systems:     Review of Systems   Constitutional:  Negative for activity change, appetite change, fatigue and unexpected weight change.   HENT:  Negative for trouble swallowing.    Respiratory:  Negative for shortness of breath.    Cardiovascular:  Negative for chest pain.   Gastrointestinal:  Positive for blood in stool (Melena) and constipation (Occasional). Negative for abdominal distention, abdominal pain, anal bleeding, diarrhea, nausea, rectal pain and vomiting.   Neurological:  Negative for dizziness, weakness and light-headedness.       Objective:     VITAL SIGNS: 24 HR MIN & MAX LAST    Temp  Min: 97.7 °F (36.5 °C)  Max: 98.6 °F (37 °C)  97.7 °F (36.5 °C)        BP  Min: 117/87  Max: 174/109  (!) 148/92     Pulse  Min: 86  Max: 105  88     Resp  Min: 14  Max: 23  18    SpO2  Min: 95 %  Max: 100 %  97 %      No intake or output data in the 24 hours ending 04/24/25 0812    Physical Exam  Constitutional:       General: She is not in acute distress.     Appearance: She is obese. She is not ill-appearing.   HENT:      Head: Normocephalic and atraumatic.      Mouth/Throat:      Mouth: Mucous membranes are moist.      Pharynx: Oropharynx is clear.   Eyes:      Extraocular Movements: Extraocular movements intact.      Pupils: Pupils are equal, round, and reactive to light.   Cardiovascular:      Rate and Rhythm: Normal rate and regular  rhythm.      Pulses: Normal pulses.      Heart sounds: Murmur heard.   Pulmonary:      Effort: Pulmonary effort is normal.      Breath sounds: Normal breath sounds.   Abdominal:      General: Bowel sounds are normal. There is no distension.      Tenderness: There is no abdominal tenderness. There is no guarding.   Musculoskeletal:         General: Normal range of motion.      Right lower leg: No edema.      Left lower leg: No edema.   Skin:     General: Skin is warm and dry.   Neurological:      General: No focal deficit present.      Mental Status: She is alert.   Psychiatric:         Mood and Affect: Mood normal.         Behavior: Behavior normal.         Thought Content: Thought content normal.         Judgment: Judgment normal.           Recent Results (from the past 48 hours)   EKG 12-lead    Collection Time: 04/23/25  8:03 AM   Result Value Ref Range    QRS Duration 84 ms    [facility] QTC Calculation 457 ms   Comprehensive metabolic panel    Collection Time: 04/23/25  9:02 AM   Result Value Ref Range    Sodium 139 136 - 145 mmol/L    Potassium 3.7 3.5 - 5.1 mmol/L    Chloride 107 98 - 107 mmol/L    CO2 28 22 - 29 mmol/L    Glucose 119 (H) 74 - 100 mg/dL    Blood Urea Nitrogen 19.5 (H) 7.0 - 18.7 mg/dL    Creatinine 0.86 0.55 - 1.02 mg/dL    Calcium 8.7 8.4 - 10.2 mg/dL    Protein Total 6.4 6.4 - 8.3 gm/dL    Albumin 3.0 (L) 3.5 - 5.0 g/dL    Globulin 3.4 2.4 - 3.5 gm/dL    Albumin/Globulin Ratio 0.9 (L) 1.1 - 2.0 ratio    Bilirubin Total 0.2 <=1.5 mg/dL    ALP 85 40 - 150 unit/L    ALT 21 0 - 55 unit/L    AST 15 11 - 45 unit/L    eGFR >60 mL/min/1.73/m2    Anion Gap 4.0 mEq/L    BUN/Creatinine Ratio 23    Troponin I    Collection Time: 04/23/25  9:02 AM   Result Value Ref Range    Troponin-I <0.010 0.000 - 0.045 ng/mL   Protime-INR    Collection Time: 04/23/25  9:02 AM   Result Value Ref Range    PT <10.0 (L) 12.5 - 14.5 seconds    INR 1.0 <=1.3   CBC with Differential    Collection Time: 04/23/25  9:02 AM   Result  Value Ref Range    WBC 11.23 4.50 - 11.50 x10(3)/mcL    RBC 2.10 (L) 4.20 - 5.40 x10(6)/mcL    Hgb 5.6 (LL) 12.0 - 16.0 g/dL    Hct 18.5 (LL) 37.0 - 47.0 %    MCV 88.1 80.0 - 94.0 fL    MCH 26.7 (L) 27.0 - 31.0 pg    MCHC 30.3 (L) 33.0 - 36.0 g/dL    RDW 17.0 11.5 - 17.0 %    Platelet 379 130 - 400 x10(3)/mcL    MPV 10.2 7.4 - 10.4 fL    Neut % 66.7 %    Lymph % 25.3 %    Mono % 4.5 %    Eos % 2.0 %    Basophil % 0.3 %    Imm Grans % 1.2 %    Neut # 7.51 2.1 - 9.2 x10(3)/mcL    Lymph # 2.84 0.6 - 4.6 x10(3)/mcL    Mono # 0.50 0.1 - 1.3 x10(3)/mcL    Eos # 0.22 0 - 0.9 x10(3)/mcL    Baso # 0.03 <=0.2 x10(3)/mcL    Imm Gran # 0.13 (H) 0.00 - 0.04 x10(3)/mcL   Ferritin    Collection Time: 04/23/25  9:02 AM   Result Value Ref Range    Ferritin Level 83.40 4.63 - 204.00 ng/mL   Iron and TIBC    Collection Time: 04/23/25  9:02 AM   Result Value Ref Range    Iron Binding Capacity Unsaturated 248 70 - 310 ug/dL    Iron Level 30 (L) 50 - 170 ug/dL    Transferrin 248 180 - 382 mg/dL    Iron Binding Capacity Total 278 250 - 450 ug/dL    Iron Saturation 11 (L) 20 - 50 %   Vitamin B12    Collection Time: 04/23/25  9:02 AM   Result Value Ref Range    Vitamin B12 439 213 - 816 pg/mL   Occult Blood, Stool 1st Specimen    Collection Time: 04/23/25  9:59 AM   Result Value Ref Range    Occult Blood Stool 1 Positive (A) Negative   D dimer, quantitative    Collection Time: 04/23/25 10:25 AM   Result Value Ref Range    D-Dimer 0.19 0.00 - 0.50 ug/mL FEU   Prepare RBC 2 Units; emergency    Collection Time: 04/23/25 10:25 AM   Result Value Ref Range    UNIT NUMBER [id number]     UNIT ABO/RH O NEG     DISPENSE STATUS Transfused     Unit Expiration [id number]     Product Code P2887G97     Unit Blood Type Code 9500     CROSSMATCH INTERPRETATION Compatible     UNIT NUMBER [id number]     UNIT ABO/RH O NEG     DISPENSE STATUS Transfused     Unit Expiration [id number]     Product Code D6675A06     Unit Blood Type Code 9500      CROSSMATCH INTERPRETATION Compatible    Type & Screen    Collection Time: 04/23/25 10:25 AM   Result Value Ref Range    Group & Rh O NEG     Indirect Abhinav GEL NEG     Specimen Outdate 04/26/2025 23:59    Folate    Collection Time: 04/23/25 10:25 AM   Result Value Ref Range    Folate Level 10.6 7.0 - 31.4 ng/mL   Troponin I    Collection Time: 04/23/25  9:18 PM   Result Value Ref Range    Troponin-I <0.010 0.000 - 0.045 ng/mL   Type & Screen    Collection Time: 04/23/25  9:18 PM   Result Value Ref Range    Group & Rh O NEG     Indirect Abhinav GEL NEG     Specimen Outdate 04/26/2025 23:59    Comprehensive Metabolic Panel (CMP)    Collection Time: 04/24/25  5:43 AM   Result Value Ref Range    Sodium 137 136 - 145 mmol/L    Potassium 3.8 3.5 - 5.1 mmol/L    Chloride 105 98 - 107 mmol/L    CO2 25 22 - 29 mmol/L    Glucose 130 (H) 74 - 100 mg/dL    Blood Urea Nitrogen 15.1 7.0 - 18.7 mg/dL    Creatinine 1.01 0.55 - 1.02 mg/dL    Calcium 8.1 (L) 8.4 - 10.2 mg/dL    Protein Total 6.1 (L) 6.4 - 8.3 gm/dL    Albumin 3.1 (L) 3.5 - 5.0 g/dL    Globulin 3.0 2.4 - 3.5 gm/dL    Albumin/Globulin Ratio 1.0 (L) 1.1 - 2.0 ratio    Bilirubin Total 0.5 <=1.5 mg/dL    ALP 85 40 - 150 unit/L    ALT 21 0 - 55 unit/L    AST 22 11 - 45 unit/L    eGFR >60 mL/min/1.73/m2    Anion Gap 7.0 mEq/L    BUN/Creatinine Ratio 15    Magnesium    Collection Time: 04/24/25  5:43 AM   Result Value Ref Range    Magnesium Level 2.00 1.60 - 2.60 mg/dL   Troponin I    Collection Time: 04/24/25  5:43 AM   Result Value Ref Range    Troponin-I <0.010 0.000 - 0.045 ng/mL   CBC with Differential    Collection Time: 04/24/25  5:43 AM   Result Value Ref Range    WBC 8.06 4.50 - 11.50 x10(3)/mcL    RBC 2.70 (L) 4.20 - 5.40 x10(6)/mcL    Hgb 7.2 (L) 12.0 - 16.0 g/dL    Hct 23.7 (L) 37.0 - 47.0 %    MCV 87.8 80.0 - 94.0 fL    MCH 26.7 (L) 27.0 - 31.0 pg    MCHC 30.4 (L) 33.0 - 36.0 g/dL    RDW 16.7 11.5 - 17.0 %    Platelet 347 130 - 400 x10(3)/mcL    MPV 10.0 7.4 -  10.4 fL    Neut % 58.8 %    Lymph % 31.9 %    Mono % 5.3 %    Eos % 2.4 %    Basophil % 0.6 %    Imm Grans % 1.0 %    Neut # 4.74 2.1 - 9.2 x10(3)/mcL    Lymph # 2.57 0.6 - 4.6 x10(3)/mcL    Mono # 0.43 0.1 - 1.3 x10(3)/mcL    Eos # 0.19 0 - 0.9 x10(3)/mcL    Baso # 0.05 <=0.2 x10(3)/mcL    Imm Gran # 0.08 (H) 0.00 - 0.04 x10(3)/mcL    NRBC% 0.2 %       X-Ray Chest AP Portable  Result Date: 4/23/2025  EXAMINATION: XR CHEST AP PORTABLE CLINICAL HISTORY: Chest Pain; COMPARISON: 16 September 2021 FINDINGS: Frontal view of the chest was obtained. Heart is mildly enlarged.  Grossly clear lungs.  No pneumothorax.     No acute findings. Electronically signed by: Andrzej Becker Date:    04/23/2025 Time:    09:42      Imaging personally reviewed by myself and SP.    Assessment / Plan:   45-year-old female unknown to our group with a past medical history of anemia, as HTN, sleep apnea and HLD who presented to [facility] 4/23/25 has a transfer from [facility] for gastroenterology services.  Patient originally presented there complaining of chest pain and dyspnea.  On arrival to the ER, patient mildly hypertensive and tachycardic but otherwise afebrile and oxygenating well on room air.  Labs notable for H&H 5.6/18.5, iron 30, iron sat 11%.  Stool positive for occult blood.    GI has been consulted for GI bleed, positive occult blood.    Iron deficiency anemia  - Hgb 5.6--(2 units PRBC)--7.2  - Iron 30, iron sat 11%  Melena  - FOBT +  3.  Changes in bowel pattern    -Continue ppi bid  -Monitor H/H and transfuse as needed to Hgb 7  -Monitor stools for bleeding  -Ok for clear liquid diet today.  NPO after midnight tonight for EGD and colonoscopy tomorrow, 4/25.  Bowel prep as ordered.  -Additional recommendations pending endoscopy findings     Thank you for allowing us to participate in this patient's care.   Case and plan discussed with Dr. Tony Fountain, MOISES-OBEY         [1]   Current Facility-Administered Medications    Medication Dose Route Frequency Provider Last Rate Last Admin    acetaminophen tablet 650 mg  650 mg Oral Q4H PRN Donna Byrnessa, FNP        aluminum-magnesium hydroxide-simethicone 200-200-20 mg/5 mL suspension 30 mL  30 mL Oral QID PRN Donna Byrnessa, FNP        bisacodyL suppository 10 mg  10 mg Rectal Daily PRN Donna Byrnessa, FNP        dextrose 50% injection 12.5 g  12.5 g Intravenous PRN Fitz Nadja, FNP        dextrose 50% injection 25 g  25 g Intravenous PRN Fitz, Nadja, FNP        glucagon (human recombinant) injection 1 mg  1 mg Intramuscular PRN Donna Byrnessa, FNP        glucose chewable tablet 16 g  16 g Oral PRN Fitz Nadja, FNP        glucose chewable tablet 24 g  24 g Oral PRN Fitz, Nadja, FNP        melatonin tablet 6 mg  6 mg Oral Nightly PRN Donna Byrnessa, FNP        pantoprazole injection 40 mg  40 mg Intravenous BID Donna Byrnessa, FNP        polyethylene glycol packet 17 g  17 g Oral BID PRN Donna Byrnessa, FNP        sodium chloride 0.9% flush 10 mL  10 mL Intravenous PRN Nadja Byrnes, FNP       [2]   Medications Prior to Admission   Medication Sig Dispense Refill Last Dose/Taking    amLODIPine (NORVASC) 10 MG tablet Take 10 mg by mouth once daily.   4/22/2025    losartan (COZAAR) 25 MG tablet Take 12.5 mg by mouth once daily.   4/22/2025    nebivoloL (BYSTOLIC) 2.5 MG Tab Take 10 mg by mouth once daily. (Patient taking differently: Take 10 mg by mouth once daily. Patient unsure of dose)   Unknown

## 2025-04-24 NOTE — PLAN OF CARE
Patient lives with spouse and 2 sons (ages 24 and 17) in a single story home with 3 steps prior to entrance. Patient is independent and driving self at home.     PCP: Shantel Barbour NP  Pharmacy: [facility]     04/24/25 0911   Discharge Assessment   Assessment Type Discharge Planning Assessment   Confirmed/corrected address, phone number and insurance Yes   Confirmed Demographics Correct on Facesheet   Source of Information patient   Reason For Admission Acute upper GI bleeding   People in Home spouse;child(terrence), adult;child(terrence), dependent   Do you expect to return to your current living situation? Yes   Do you have help at home or someone to help you manage your care at home? Yes   Who are your caregiver(s) and their phone number(s)? Andrzej (spouse) [phone]   Prior to hospitilization cognitive status: Alert/Oriented   Current cognitive status: Alert/Oriented   Home Accessibility wheelchair accessible   Home Layout Able to live on 1st floor   Equipment Currently Used at Home none   Readmission within 30 days? No   Patient currently being followed by outpatient case management? No   Do you currently have service(s) that help you manage your care at home? No   Do you take prescription medications? No   How do you get to doctors appointments? car, drives self   Discharge Plan A Home   Discharge Plan B Home with family   DME Needed Upon Discharge  none   Discharge Plan discussed with: Patient   Transition of Care Barriers None

## 2025-04-25 ENCOUNTER — ANESTHESIA (OUTPATIENT)
Dept: ENDOSCOPY | Facility: HOSPITAL | Age: 45
DRG: 378 | End: 2025-04-25
Payer: COMMERCIAL

## 2025-04-25 ENCOUNTER — ANESTHESIA EVENT (OUTPATIENT)
Dept: ENDOSCOPY | Facility: HOSPITAL | Age: 45
DRG: 378 | End: 2025-04-25
Payer: COMMERCIAL

## 2025-04-25 LAB
ALBUMIN SERPL-MCNC: 3.3 G/DL (ref 3.5–5)
ALBUMIN/GLOB SERPL: 0.9 RATIO (ref 1.1–2)
ALP SERPL-CCNC: 92 UNIT/L (ref 40–150)
ALT SERPL-CCNC: 24 UNIT/L (ref 0–55)
ANION GAP SERPL CALC-SCNC: 6 MEQ/L
AST SERPL-CCNC: 22 UNIT/L (ref 11–45)
BASOPHILS # BLD AUTO: 0.03 X10(3)/MCL
BASOPHILS NFR BLD AUTO: 0.4 %
BILIRUB SERPL-MCNC: 0.3 MG/DL
BUN SERPL-MCNC: 7.6 MG/DL (ref 7–18.7)
CALCIUM SERPL-MCNC: 8.6 MG/DL (ref 8.4–10.2)
CHLORIDE SERPL-SCNC: 108 MMOL/L (ref 98–107)
CO2 SERPL-SCNC: 27 MMOL/L (ref 22–29)
CREAT SERPL-MCNC: 0.94 MG/DL (ref 0.55–1.02)
CREAT/UREA NIT SERPL: 8
EOSINOPHIL # BLD AUTO: 0.11 X10(3)/MCL (ref 0–0.9)
EOSINOPHIL NFR BLD AUTO: 1.3 %
ERYTHROCYTE [DISTWIDTH] IN BLOOD BY AUTOMATED COUNT: 17 % (ref 11.5–17)
GFR SERPLBLD CREATININE-BSD FMLA CKD-EPI: >60 ML/MIN/1.73/M2
GLOBULIN SER-MCNC: 3.6 GM/DL (ref 2.4–3.5)
GLUCOSE SERPL-MCNC: 168 MG/DL (ref 74–100)
HCT VFR BLD AUTO: 26.2 % (ref 37–47)
HGB BLD-MCNC: 7.9 G/DL (ref 12–16)
IMM GRANULOCYTES # BLD AUTO: 0.05 X10(3)/MCL (ref 0–0.04)
IMM GRANULOCYTES NFR BLD AUTO: 0.6 %
LYMPHOCYTES # BLD AUTO: 1.84 X10(3)/MCL (ref 0.6–4.6)
LYMPHOCYTES NFR BLD AUTO: 22 %
MAGNESIUM SERPL-MCNC: 2.2 MG/DL (ref 1.6–2.6)
MCH RBC QN AUTO: 26.2 PG (ref 27–31)
MCHC RBC AUTO-ENTMCNC: 30.2 G/DL (ref 33–36)
MCV RBC AUTO: 87 FL (ref 80–94)
MONOCYTES # BLD AUTO: 0.35 X10(3)/MCL (ref 0.1–1.3)
MONOCYTES NFR BLD AUTO: 4.2 %
NEUTROPHILS # BLD AUTO: 5.97 X10(3)/MCL (ref 2.1–9.2)
NEUTROPHILS NFR BLD AUTO: 71.5 %
NRBC BLD AUTO-RTO: 0 %
PLATELET # BLD AUTO: 410 X10(3)/MCL (ref 130–400)
PMV BLD AUTO: 9.9 FL (ref 7.4–10.4)
POTASSIUM SERPL-SCNC: 3.7 MMOL/L (ref 3.5–5.1)
PROT SERPL-MCNC: 6.9 GM/DL (ref 6.4–8.3)
RBC # BLD AUTO: 3.01 X10(6)/MCL (ref 4.2–5.4)
SODIUM SERPL-SCNC: 141 MMOL/L (ref 136–145)
WBC # BLD AUTO: 8.35 X10(3)/MCL (ref 4.5–11.5)

## 2025-04-25 PROCEDURE — 0DJD8ZZ INSPECTION OF LOWER INTESTINAL TRACT, VIA NATURAL OR ARTIFICIAL OPENING ENDOSCOPIC: ICD-10-PCS | Performed by: STUDENT IN AN ORGANIZED HEALTH CARE EDUCATION/TRAINING PROGRAM

## 2025-04-25 PROCEDURE — 45378 DIAGNOSTIC COLONOSCOPY: CPT | Performed by: STUDENT IN AN ORGANIZED HEALTH CARE EDUCATION/TRAINING PROGRAM

## 2025-04-25 PROCEDURE — 63600175 PHARM REV CODE 636 W HCPCS

## 2025-04-25 PROCEDURE — 80053 COMPREHEN METABOLIC PANEL: CPT | Performed by: INTERNAL MEDICINE

## 2025-04-25 PROCEDURE — 27201423 OPTIME MED/SURG SUP & DEVICES STERILE SUPPLY: Performed by: STUDENT IN AN ORGANIZED HEALTH CARE EDUCATION/TRAINING PROGRAM

## 2025-04-25 PROCEDURE — 37000009 HC ANESTHESIA EA ADD 15 MINS: Performed by: STUDENT IN AN ORGANIZED HEALTH CARE EDUCATION/TRAINING PROGRAM

## 2025-04-25 PROCEDURE — 0DB98ZX EXCISION OF DUODENUM, VIA NATURAL OR ARTIFICIAL OPENING ENDOSCOPIC, DIAGNOSTIC: ICD-10-PCS | Performed by: STUDENT IN AN ORGANIZED HEALTH CARE EDUCATION/TRAINING PROGRAM

## 2025-04-25 PROCEDURE — 25000003 PHARM REV CODE 250: Performed by: INTERNAL MEDICINE

## 2025-04-25 PROCEDURE — 36415 COLL VENOUS BLD VENIPUNCTURE: CPT | Performed by: INTERNAL MEDICINE

## 2025-04-25 PROCEDURE — 11000001 HC ACUTE MED/SURG PRIVATE ROOM

## 2025-04-25 PROCEDURE — D9220A PRA ANESTHESIA: Mod: ANES,,, | Performed by: ANESTHESIOLOGY

## 2025-04-25 PROCEDURE — 25000003 PHARM REV CODE 250

## 2025-04-25 PROCEDURE — D9220A PRA ANESTHESIA: Mod: CRNA,,,

## 2025-04-25 PROCEDURE — 43239 EGD BIOPSY SINGLE/MULTIPLE: CPT | Performed by: STUDENT IN AN ORGANIZED HEALTH CARE EDUCATION/TRAINING PROGRAM

## 2025-04-25 PROCEDURE — 0DB68ZX EXCISION OF STOMACH, VIA NATURAL OR ARTIFICIAL OPENING ENDOSCOPIC, DIAGNOSTIC: ICD-10-PCS | Performed by: STUDENT IN AN ORGANIZED HEALTH CARE EDUCATION/TRAINING PROGRAM

## 2025-04-25 PROCEDURE — 37000008 HC ANESTHESIA 1ST 15 MINUTES: Performed by: STUDENT IN AN ORGANIZED HEALTH CARE EDUCATION/TRAINING PROGRAM

## 2025-04-25 PROCEDURE — 83735 ASSAY OF MAGNESIUM: CPT | Performed by: INTERNAL MEDICINE

## 2025-04-25 PROCEDURE — 85025 COMPLETE CBC W/AUTO DIFF WBC: CPT | Performed by: INTERNAL MEDICINE

## 2025-04-25 RX ORDER — ONDANSETRON HYDROCHLORIDE 2 MG/ML
4 INJECTION, SOLUTION INTRAVENOUS DAILY PRN
OUTPATIENT
Start: 2025-04-25

## 2025-04-25 RX ORDER — PROPOFOL 10 MG/ML
VIAL (ML) INTRAVENOUS CONTINUOUS PRN
Status: DISCONTINUED | OUTPATIENT
Start: 2025-04-25 | End: 2025-04-26

## 2025-04-25 RX ORDER — SODIUM CHLORIDE, SODIUM GLUCONATE, SODIUM ACETATE, POTASSIUM CHLORIDE AND MAGNESIUM CHLORIDE 30; 37; 368; 526; 502 MG/100ML; MG/100ML; MG/100ML; MG/100ML; MG/100ML
INJECTION, SOLUTION INTRAVENOUS CONTINUOUS
OUTPATIENT
Start: 2025-04-25 | End: 2025-05-25

## 2025-04-25 RX ORDER — LIDOCAINE HYDROCHLORIDE 10 MG/ML
INJECTION, SOLUTION EPIDURAL; INFILTRATION; INTRACAUDAL; PERINEURAL
Status: COMPLETED
Start: 2025-04-25 | End: 2025-04-25

## 2025-04-25 RX ORDER — PROPOFOL 10 MG/ML
VIAL (ML) INTRAVENOUS
Status: COMPLETED
Start: 2025-04-25 | End: 2025-04-25

## 2025-04-25 RX ORDER — LIDOCAINE HYDROCHLORIDE 10 MG/ML
INJECTION, SOLUTION EPIDURAL; INFILTRATION; INTRACAUDAL; PERINEURAL
Status: DISCONTINUED | OUTPATIENT
Start: 2025-04-25 | End: 2025-04-26

## 2025-04-25 RX ADMIN — PANTOPRAZOLE SODIUM 40 MG: 40 INJECTION, POWDER, FOR SOLUTION INTRAVENOUS at 09:04

## 2025-04-25 RX ADMIN — PROPOFOL 50 MG: 10 INJECTION, EMULSION INTRAVENOUS at 08:04

## 2025-04-25 RX ADMIN — METOPROLOL TARTRATE 25 MG: 25 TABLET, FILM COATED ORAL at 09:04

## 2025-04-25 RX ADMIN — SODIUM SULFATE, POTASSIUM SULFATE, MAGNESIUM SULFATE 180 ML: 17.5; 3.13; 1.6 SOLUTION, CONCENTRATE ORAL at 05:04

## 2025-04-25 RX ADMIN — LIDOCAINE HYDROCHLORIDE 50 MG: 10 INJECTION, SOLUTION EPIDURAL; INFILTRATION; INTRACAUDAL; PERINEURAL at 08:04

## 2025-04-25 RX ADMIN — PROPOFOL 100 MCG/KG/MIN: 10 INJECTION, EMULSION INTRAVENOUS at 08:04

## 2025-04-25 RX ADMIN — SODIUM CHLORIDE, SODIUM GLUCONATE, SODIUM ACETATE, POTASSIUM CHLORIDE AND MAGNESIUM CHLORIDE: 526; 502; 368; 37; 30 INJECTION, SOLUTION INTRAVENOUS at 07:04

## 2025-04-25 NOTE — TRANSFER OF CARE
Anesthesia Transfer of Care Note    Patient: Min Desir    Procedure(s) Performed: Procedure(s) (LRB):  DOUBLE (N/A)  COLON (N/A)  EGD, WITH CLOSED BIOPSY    Patient location: GI    Anesthesia Type: general    Transport from OR: Transported from OR on room air with adequate spontaneous ventilation    Post pain: adequate analgesia    Post assessment: no apparent anesthetic complications and tolerated procedure well    Post vital signs: stable    Level of consciousness: awake    Nausea/Vomiting: no nausea/vomiting    Complications: none    Transfer of care protocol was followed    Last vitals: Visit Vitals  BP (!) 144/93   Pulse 93   Temp 36.6 °C (97.9 °F)   Resp 18   Ht 5' (1.524 m)   Wt 119.4 kg (263 lb 3.7 oz)   SpO2 100%   Breastfeeding No   BMI 51.41 kg/m²

## 2025-04-25 NOTE — ANESTHESIA PREPROCEDURE EVALUATION
04/25/2025  Min Desir is a 45 y.o., female with   -------------------------------------    Hypertension    Sleep apnea   Super Morbid Obesity BMI of 51    And   ----------------------------    Hysterectomy       Presents for EGD/\colonosocpy   Recent hx of melena, SOB found to be anemic  Received 2 units PRBCs      Pre-op Assessment    I have reviewed the NPO Status.      Review of Systems  Cardiovascular:     Hypertension                                    Hypertension         Pulmonary:        Sleep Apnea     Obstructive Sleep Apnea (SHANTANU).               Physical Exam  General: Well nourished, Cooperative, Alert and Oriented    Airway:  Mallampati: III   Mouth Opening: Normal  TM Distance: Normal  Tongue: Normal  Neck ROM: Normal ROM  Neck: Girth Increased    Dental:  Intact    Chest/Lungs:  Clear to auscultation, Normal Respiratory Rate    Heart:  Rate: Normal  Rhythm: Regular Rhythm       Latest Reference Range & Units 04/23/25 09:02 04/24/25 05:43   Hemoglobin 12.0 - 16.0 g/dL 5.6 (LL) 7.2 (L)   Hematocrit 37.0 - 47.0 % 18.5 (LL) 23.7 (L)   (LL): Data is critically low  (L): Data is abnormally low   Latest Reference Range & Units 04/23/25 09:02 04/24/25 05:43   Hemoglobin 12.0 - 16.0 g/dL 5.6 (LL) 7.2 (L)   Hematocrit 37.0 - 47.0 % 18.5 (LL) 23.7 (L)   Sodium 136 - 145 mmol/L 139 137   Potassium 3.5 - 5.1 mmol/L 3.7 3.8   Chloride 98 - 107 mmol/L 107 105   CO2 22 - 29 mmol/L 28 25   Anion Gap mEq/L 4.0 7.0   BUN 7.0 - 18.7 mg/dL 19.5 (H) 15.1   Creatinine 0.55 - 1.02 mg/dL 0.86 1.01   BUN/CREAT RATIO  23 15   eGFR mL/min/1.73/m2 >60 >60   Glucose 74 - 100 mg/dL 119 (H) 130 (H)   Calcium 8.4 - 10.2 mg/dL 8.7 8.1 (L)   (LL): Data is critically low  (L): Data is abnormally low  (H): Data is abnormally high    Anesthesia Plan  Type of Anesthesia, risks & benefits discussed:    Anesthesia Type: Gen  Natural Airway  Intra-op Monitoring Plan: Standard ASA Monitors  Post Op Pain Control Plan: IV/PO Opioids PRN  Induction:  IV  Airway Plan: Direct  Informed Consent: Informed consent signed with the Patient and all parties understand the risks and agree with anesthesia plan.  All questions answered. Patient consented to blood products? No  ASA Score: 3  Day of Surgery Review of History & Physical: H&P Update referred to the surgeon/provider.  Anesthesia Plan Notes: Nasal cannula vs facemask supplemental oxygenation   For patients with SHANTANU/obesity, may consider SuperNoval Nasal CPAP      Ready For Surgery From Anesthesia Perspective.     .

## 2025-04-25 NOTE — PROVATION PATIENT INSTRUCTIONS
Discharge Summary/Instructions after an Endoscopic Procedure  Patient Name: Min Desir  Patient MRN: [id number]  Patient YOB: 1980 Friday, April 25, 2025  Bertrand Jimenez MD  Dear patient,  As a result of recent federal legislation (The Federal Cures Act), you may   receive lab or pathology results from your procedure in your [facility]   account before your physician is able to contact you. Your physician or   their representative will relay the results to you with their   recommendations at their soonest availability.  Thank you,  RESTRICTIONS:  During your procedure today, you received medications for sedation.  These   medications may affect your judgment, balance and coordination.  Therefore,   for 24 hours, you have the following restrictions:   - DO NOT drive a car, operate machinery, make legal/financial decisions,   sign important papers or drink alcohol.    ACTIVITY:  Today: no heavy lifting, straining or running due to procedural   sedation/anesthesia.  The following day: return to full activity including work.  DIET:  Eat and drink normally unless instructed otherwise.     TREATMENT FOR COMMON SIDE EFFECTS:  - Mild abdominal pain, nausea, belching, bloating or excessive gas:  rest,   eat lightly and use a heating pad.  - Sore Throat: treat with throat lozenges and/or gargle with warm salt   water.  - Because air was used during the procedure, expelling large amounts of air   from your rectum or belching is normal.  - If a bowel prep was taken, you may not have a bowel movement for 1-3 days.    This is normal.  SYMPTOMS TO WATCH FOR AND REPORT TO YOUR PHYSICIAN:  1. Abdominal pain or bloating, other than gas cramps.  2. Chest pain.  3. Back pain.  4. Signs of infection such as: chills or fever occurring within 24 hours   after the procedure.  5. Rectal bleeding, which would show as bright red, maroon, or black stools.   (A tablespoon of blood from the rectum is not serious, especially  if   hemorrhoids are present.)  6. Vomiting.  7. Weakness or dizziness.  GO DIRECTLY TO THE NEAREST EMERGENCY ROOM IF YOU HAVE ANY OF THE FOLLOWING:      Difficulty breathing              Chills and/or fever over 101 F   Persistent vomiting and/or vomiting blood   Severe abdominal pain   Severe chest pain   Black, tarry stools   Bleeding- more than one tablespoon   Any other symptom or condition that you feel may need urgent attention  Your doctor recommends these additional instructions:  If any biopsies were taken, your doctors clinic will contact you in 1 to 2   weeks with any results.  Recommendations:  - Return patient to [facility] for ongoing care.   - Resume previous diet.   - Continue present medications.   - Repeat colonoscopy in 8 years for screening purposes.   - Outpatient VCE with me to complete PHILIP evaluation  - Needs GI clinic f/u with me  - Ok for discharge from GI standpoint. Please call with questions or   concerns  Impressions:  EGD/cscope for PHILIP requiring blood products with melena. Hx of ROBBY  - Non-bleeding internal hemorrhoids.   - The examined portion of the ileum was normal.   - No specimens collected.   - Concern for chronic GI blood loss anemia without a clear source. Plausibly   has Celiac and bx's taken today on EGD  For questions, problems or results please call your physician - Bertrand Jimenez MD at Work:  [phone].  [facility] ED at [phone]  IF A COMPLICATION OR EMERGENCY SITUATION ARISES AND YOU ARE UNABLE TO REACH   YOUR PHYSICIAN - GO DIRECTLY TO THE EMERGENCY ROOM.  MD Bertrand Gabriel MD  4/25/2025 8:40:10 AM  This report has been verified and signed electronically.  Dear patient,  As a result of recent federal legislation (The Federal Cures Act), you may   receive lab or pathology results from your procedure in your [facility]   account before your physician is able to contact you. Your physician or   their  representative will relay the results to you with their   recommendations at their soonest availability.  Thank you,  PROVATION

## 2025-04-25 NOTE — PROGRESS NOTES
[facility]  Hospital Medicine Progress Note        Chief Complaint: Inpatient Follow-up for anemia, GI bleed    HPI:   45-year-old obese woman with medical history of HTN, SHANTANU, chronic asthma sent from outside facility for GI evaluation. Patient was seen at outside facility due to complaints of shortness a breath on exertion, generalized weakness, lethargy and had no relief with the nebulizer treatments. Workup outside revealed hemoglobin 5.6, for which she received 2 units of PRBC on transferred to [facility]. She was put on IV Protonix when seen at bedside she was alert, oriented, resting in the bed. Uses Naprosyn periodically.     GI was consulted and they recommended EGD, colonoscopy while inpatient           Interval Hx:   Scheduled for EGD and colonoscopy today.  Hemodynamically stable.  No acute events overnight.  Hemoglobin 7.9 today.  No new electrolyte abnormalities.  Troponin remained within normal limits.  Stool was positive for blood.        Objective/physical exam:  Vitals:    04/24/25 2044 04/24/25 2350 04/25/25 0533 04/25/25 0734   BP: (!) 143/97 131/83 (!) 151/101 (!) 144/93   Patient Position:  Lying Lying    Pulse: 92 84 85 93   Resp:  16 20 18   Temp:  97.9 °F (36.6 °C) 97.9 °F (36.6 °C) 97.9 °F (36.6 °C)   TempSrc:  Oral Oral    SpO2:  96% (!) 94% 100%   Weight:       Height:         General: In no acute distress, afebrile  Respiratory: Clear to auscultation bilaterally  Cardiovascular: S1, S2, no appreciable murmur  Abdomen: Soft, nontender, BS +  MSK: Warm, no lower extremity edema, no clubbing or cyanosis  Neurologic: Alert and oriented x4, moving all extremities with good strength     Lab Results   Component Value Date     04/24/2025    K 3.8 04/24/2025     04/24/2025    CO2 25 04/24/2025    BUN 15.1 04/24/2025    CREATININE 1.01 04/24/2025    CALCIUM 8.1 (L) 04/24/2025      Lab Results   Component Value Date    ALT 21  04/24/2025    AST 22 04/24/2025    ALKPHOS 85 04/24/2025    BILITOT 0.5 04/24/2025      Lab Results   Component Value Date    WBC 8.06 04/24/2025    HGB 7.2 (L) 04/24/2025    HCT 23.7 (L) 04/24/2025    MCV 87.8 04/24/2025     04/24/2025           Medications:   amLODIPine  10 mg Oral Daily    LIDOcaine (PF) 10 mg/ml (1%)        metoprolol tartrate  25 mg Oral BID    pantoprazole  40 mg Intravenous BID    propofol            Current Facility-Administered Medications:     acetaminophen, 650 mg, Oral, Q4H PRN    aluminum-magnesium hydroxide-simethicone, 30 mL, Oral, QID PRN    bisacodyL, 10 mg, Rectal, Daily PRN    dextrose 50%, 12.5 g, Intravenous, PRN    dextrose 50%, 25 g, Intravenous, PRN    glucagon (human recombinant), 1 mg, Intramuscular, PRN    glucose, 16 g, Oral, PRN    glucose, 24 g, Oral, PRN    LIDOcaine (PF) 10 mg/ml (1%), , ,     melatonin, 6 mg, Oral, Nightly PRN    polyethylene glycol, 17 g, Oral, BID PRN    propofol, , ,     sodium chloride 0.9%, 10 mL, Intravenous, PRN     Assessment/Plan:    Melena  Acute on chronic anemia due to above s/p 2 units of PRBC   Symptomatic anemia due to blood loss   Iron-deficiency  NSAID use     HX: Chronic asthma, obesity, HTN, SHANTANU     Plan:   -EGD and colonoscopy today.  Appreciate GI recommendations.  Continue Protonix   -HGB stable after transfusion.  Monitor while inpatient   -continue IV iron.  Switch to p.o. at discharge   -Other home medications were reviewed     SCDs      Grzegorz Pinto MD

## 2025-04-25 NOTE — PROVATION PATIENT INSTRUCTIONS
Discharge Summary/Instructions after an Endoscopic Procedure  Patient Name: Min Desir  Patient MRN: [id number]  Patient YOB: 1980 Friday, April 25, 2025  Bertrand Jimenez MD  Dear patient,  As a result of recent federal legislation (The Federal Cures Act), you may   receive lab or pathology results from your procedure in your [facility]   account before your physician is able to contact you. Your physician or   their representative will relay the results to you with their   recommendations at their soonest availability.  Thank you,  RESTRICTIONS:  During your procedure today, you received medications for sedation.  These   medications may affect your judgment, balance and coordination.  Therefore,   for 24 hours, you have the following restrictions:   - DO NOT drive a car, operate machinery, make legal/financial decisions,   sign important papers or drink alcohol.    ACTIVITY:  Today: no heavy lifting, straining or running due to procedural   sedation/anesthesia.  The following day: return to full activity including work.  DIET:  Eat and drink normally unless instructed otherwise.     TREATMENT FOR COMMON SIDE EFFECTS:  - Mild abdominal pain, nausea, belching, bloating or excessive gas:  rest,   eat lightly and use a heating pad.  - Sore Throat: treat with throat lozenges and/or gargle with warm salt   water.  - Because air was used during the procedure, expelling large amounts of air   from your rectum or belching is normal.  - If a bowel prep was taken, you may not have a bowel movement for 1-3 days.    This is normal.  SYMPTOMS TO WATCH FOR AND REPORT TO YOUR PHYSICIAN:  1. Abdominal pain or bloating, other than gas cramps.  2. Chest pain.  3. Back pain.  4. Signs of infection such as: chills or fever occurring within 24 hours   after the procedure.  5. Rectal bleeding, which would show as bright red, maroon, or black stools.   (A tablespoon of blood from the rectum is not serious, especially  if   hemorrhoids are present.)  6. Vomiting.  7. Weakness or dizziness.  GO DIRECTLY TO THE NEAREST EMERGENCY ROOM IF YOU HAVE ANY OF THE FOLLOWING:      Difficulty breathing              Chills and/or fever over 101 F   Persistent vomiting and/or vomiting blood   Severe abdominal pain   Severe chest pain   Black, tarry stools   Bleeding- more than one tablespoon   Any other symptom or condition that you feel may need urgent attention  Your doctor recommends these additional instructions:  If any biopsies were taken, your doctors clinic will contact you in 1 to 2   weeks with any results.  Recommendations:  - Needs PO iron daily or qod (no more, no less) with IV iron PRN ferritin   <20  - Return patient to hospital [facility] for ongoing care.   - Resume previous diet.   - Continue present medications.   - Await pathology results.   - Proceed with colonoscopy, see report.  Impressions:  - Normal esophagus.   - Normal stomach.  Biopsied.   - Duodenal erosion without bleeding.  Biopsied.  For questions, problems or results please call your physician - Bertrand Jimenez MD at Work:  [phone].  [facility] ED at [phone]  IF A COMPLICATION OR EMERGENCY SITUATION ARISES AND YOU ARE UNABLE TO REACH   YOUR PHYSICIAN - GO DIRECTLY TO THE EMERGENCY ROOM.  MD Bertrand Gabriel MD  4/25/2025 8:35:30 AM  This report has been verified and signed electronically.  Dear patient,  As a result of recent federal legislation (The Federal Cures Act), you may   receive lab or pathology results from your procedure in your [facility]   account before your physician is able to contact you. Your physician or   their representative will relay the results to you with their   recommendations at their soonest availability.  Thank you,  PROVATION

## 2025-04-25 NOTE — PLAN OF CARE
Problem: Adult Inpatient Plan of Care  Goal: Plan of Care Review  Outcome: Progressing  Flowsheets (Taken 4/25/2025 0800)  Plan of Care Reviewed With: patient  Goal: Patient-Specific Goal (Individualized)  Outcome: Progressing  Goal: Absence of Hospital-Acquired Illness or Injury  Outcome: Progressing  Intervention: Identify and Manage Fall Risk  Flowsheets (Taken 4/25/2025 0800)  Safety Promotion/Fall Prevention:   assistive device/personal item within reach   side rails raised x 2  Intervention: Prevent Skin Injury  Flowsheets (Taken 4/25/2025 0800)  Body Position:   position changed independently   weight shifting  Intervention: Prevent and Manage VTE (Venous Thromboembolism) Risk  Flowsheets (Taken 4/25/2025 0800)  VTE Prevention/Management: ROM (active) performed  Intervention: Prevent Infection  Flowsheets (Taken 4/25/2025 0800)  Infection Prevention:   environmental surveillance performed   equipment surfaces disinfected   hand hygiene promoted   rest/sleep promoted   single patient room provided  Goal: Optimal Comfort and Wellbeing  Outcome: Progressing  Intervention: Monitor Pain and Promote Comfort  Flowsheets (Taken 4/25/2025 0800)  Pain Management Interventions:   care clustered   pillow support provided   position adjusted  Intervention: Provide Person-Centered Care  Flowsheets (Taken 4/25/2025 0800)  Trust Relationship/Rapport:   care explained   choices provided   emotional support provided   empathic listening provided   questions answered   questions encouraged   thoughts/feelings acknowledged   reassurance provided  Goal: Readiness for Transition of Care  Outcome: Progressing     Problem: Bariatric Environmental Safety  Goal: Safety Maintained with Care  Outcome: Progressing

## 2025-04-26 VITALS
DIASTOLIC BLOOD PRESSURE: 92 MMHG | OXYGEN SATURATION: 100 % | HEART RATE: 81 BPM | HEIGHT: 60 IN | TEMPERATURE: 98 F | BODY MASS INDEX: 51.68 KG/M2 | RESPIRATION RATE: 16 BRPM | SYSTOLIC BLOOD PRESSURE: 136 MMHG | WEIGHT: 263.25 LBS

## 2025-04-26 PROBLEM — D64.9 SYMPTOMATIC ANEMIA: Status: ACTIVE | Noted: 2025-04-26

## 2025-04-26 LAB
ABO + RH BLD: NORMAL
ANION GAP SERPL CALC-SCNC: 7 MEQ/L
BASOPHILS # BLD AUTO: 0.03 X10(3)/MCL
BASOPHILS NFR BLD AUTO: 0.3 %
BLD PROD TYP BPU: NORMAL
BLOOD UNIT EXPIRATION DATE: NORMAL
BLOOD UNIT TYPE CODE: 9500
BUN SERPL-MCNC: 11.2 MG/DL (ref 7–18.7)
CALCIUM SERPL-MCNC: 8.2 MG/DL (ref 8.4–10.2)
CHLORIDE SERPL-SCNC: 107 MMOL/L (ref 98–107)
CO2 SERPL-SCNC: 27 MMOL/L (ref 22–29)
CREAT SERPL-MCNC: 0.97 MG/DL (ref 0.55–1.02)
CREAT/UREA NIT SERPL: 12
CROSSMATCH INTERPRETATION: NORMAL
DISPENSE STATUS: NORMAL
EOSINOPHIL # BLD AUTO: 0.17 X10(3)/MCL (ref 0–0.9)
EOSINOPHIL NFR BLD AUTO: 1.9 %
ERYTHROCYTE [DISTWIDTH] IN BLOOD BY AUTOMATED COUNT: 16.8 % (ref 11.5–17)
GFR SERPLBLD CREATININE-BSD FMLA CKD-EPI: >60 ML/MIN/1.73/M2
GLUCOSE SERPL-MCNC: 114 MG/DL (ref 74–100)
HCT VFR BLD AUTO: 23.3 % (ref 37–47)
HGB BLD-MCNC: 7.3 G/DL (ref 12–16)
IMM GRANULOCYTES # BLD AUTO: 0.03 X10(3)/MCL (ref 0–0.04)
IMM GRANULOCYTES NFR BLD AUTO: 0.3 %
LYMPHOCYTES # BLD AUTO: 2.61 X10(3)/MCL (ref 0.6–4.6)
LYMPHOCYTES NFR BLD AUTO: 29.9 %
MAGNESIUM SERPL-MCNC: 2.1 MG/DL (ref 1.6–2.6)
MCH RBC QN AUTO: 26.6 PG (ref 27–31)
MCHC RBC AUTO-ENTMCNC: 31.3 G/DL (ref 33–36)
MCV RBC AUTO: 85 FL (ref 80–94)
MONOCYTES # BLD AUTO: 0.48 X10(3)/MCL (ref 0.1–1.3)
MONOCYTES NFR BLD AUTO: 5.5 %
NEUTROPHILS # BLD AUTO: 5.41 X10(3)/MCL (ref 2.1–9.2)
NEUTROPHILS NFR BLD AUTO: 62.1 %
NRBC BLD AUTO-RTO: 0 %
PLATELET # BLD AUTO: 374 X10(3)/MCL (ref 130–400)
PMV BLD AUTO: 9.7 FL (ref 7.4–10.4)
POTASSIUM SERPL-SCNC: 3.8 MMOL/L (ref 3.5–5.1)
RBC # BLD AUTO: 2.74 X10(6)/MCL (ref 4.2–5.4)
SODIUM SERPL-SCNC: 141 MMOL/L (ref 136–145)
UNIT NUMBER: NORMAL
WBC # BLD AUTO: 8.73 X10(3)/MCL (ref 4.5–11.5)

## 2025-04-26 PROCEDURE — 36415 COLL VENOUS BLD VENIPUNCTURE: CPT | Performed by: INTERNAL MEDICINE

## 2025-04-26 PROCEDURE — 80048 BASIC METABOLIC PNL TOTAL CA: CPT | Performed by: INTERNAL MEDICINE

## 2025-04-26 PROCEDURE — 83735 ASSAY OF MAGNESIUM: CPT | Performed by: INTERNAL MEDICINE

## 2025-04-26 PROCEDURE — 85025 COMPLETE CBC W/AUTO DIFF WBC: CPT | Performed by: INTERNAL MEDICINE

## 2025-04-26 PROCEDURE — 25000003 PHARM REV CODE 250: Performed by: INTERNAL MEDICINE

## 2025-04-26 PROCEDURE — 86923 COMPATIBILITY TEST ELECTRIC: CPT | Performed by: INTERNAL MEDICINE

## 2025-04-26 PROCEDURE — P9016 RBC LEUKOCYTES REDUCED: HCPCS | Performed by: INTERNAL MEDICINE

## 2025-04-26 PROCEDURE — 36430 TRANSFUSION BLD/BLD COMPNT: CPT

## 2025-04-26 PROCEDURE — 30233N1 TRANSFUSION OF NONAUTOLOGOUS RED BLOOD CELLS INTO PERIPHERAL VEIN, PERCUTANEOUS APPROACH: ICD-10-PCS | Performed by: INTERNAL MEDICINE

## 2025-04-26 PROCEDURE — 63600175 PHARM REV CODE 636 W HCPCS

## 2025-04-26 RX ORDER — AMOXICILLIN 250 MG
2 CAPSULE ORAL 2 TIMES DAILY
Qty: 120 TABLET | Refills: 2 | Status: SHIPPED | OUTPATIENT
Start: 2025-04-26 | End: 2025-07-25

## 2025-04-26 RX ORDER — FERROUS SULFATE 325(65) MG
325 TABLET ORAL 2 TIMES DAILY
Qty: 180 TABLET | Refills: 0 | Status: SHIPPED | OUTPATIENT
Start: 2025-04-26 | End: 2025-07-25

## 2025-04-26 RX ORDER — HYDROCODONE BITARTRATE AND ACETAMINOPHEN 500; 5 MG/1; MG/1
TABLET ORAL
Status: DISCONTINUED | OUTPATIENT
Start: 2025-04-26 | End: 2025-04-26 | Stop reason: HOSPADM

## 2025-04-26 RX ADMIN — PANTOPRAZOLE SODIUM 40 MG: 40 INJECTION, POWDER, FOR SOLUTION INTRAVENOUS at 08:04

## 2025-04-26 RX ADMIN — METOPROLOL TARTRATE 25 MG: 25 TABLET, FILM COATED ORAL at 08:04

## 2025-04-26 NOTE — NURSING
IV started leaking 10 minutes into blood transfuse. IV removed, new IV started, blood restarted ar 1010- will restart 15 minute observation.

## 2025-04-26 NOTE — DISCHARGE SUMMARY
[facility]  Hospital Medicine Discharge Summary    Admit Date: 4/23/2025  Discharge Date and Time: 4/26/20257:34 AM  Admitting Physician:  Team  Discharging Physician: Grzegorz Pinto MD.  Primary Care Physician: Shantel Barbour NP  Consults: Gastroenterology      45-year-old obese woman with medical history of HTN, SHANTANU, chronic asthma sent from outside facility for GI evaluation. Patient was seen at outside facility due to complaints of shortness a breath on exertion, generalized weakness, lethargy and had no relief with the nebulizer treatments. Workup outside revealed hemoglobin 5.6, for which she received 2 units of PRBC on transferred to [facility]. She was put on IV Protonix when seen at bedside she was alert, oriented, resting in the bed. Uses Naprosyn periodically.      GI was consulted and they recommended EGD, colonoscopy while inpatient .  EGD showed duodenal erosions without bleeding, normal stomach which was biopsied.  GI recommended iron therapy.  Colonoscopy revealed nonbleeding internal hemorrhoids.  GI suggested outpatient capsule study for further workup and cleared for discharge.  She will receive another unit of blood before discharge.  Recommended follow up with PCP and suggested to avoid NSAIDs.  She verbalized understanding of all the recommendations         Melena  Acute on chronic anemia due to above s/p 3 units of PRBC   Symptomatic anemia due to blood loss   Iron-deficiency  NSAID use     HX: Chronic asthma, obesity, HTN, SHANTANU     Pt was seen and examined on the day of discharge  Vitals:  VITAL SIGNS: 24 HRS MIN & MAX LAST   Temp  Min: 97.9 °F (36.6 °C)  Max: 98.6 °F (37 °C) 98.3 °F (36.8 °C)   BP  Min: 136/84  Max: 154/90 (!) 141/93   Pulse  Min: 79  Max: 91  83   Resp  Min: 16  Max: 16 16   SpO2  Min: 96 %  Max: 98 % 98 %     General: Comfortable, not in distress  Respiratory: Clear to auscultation bilaterally, nonlabored  breathing  Cardiovascular: RRR, S1, S2  Abdominal: Soft, nontender, nondistended  Neurological: AOx4, no focal deficits  Psychiatric: Cooperative          Procedures Performed: No admission procedures for hospital encounter.     Significant Diagnostic Studies: See Full reports for all details    Recent Labs   Lab 04/24/25  0543 04/25/25  0954 04/26/25  0333   WBC 8.06 8.35 8.73   RBC 2.70* 3.01* 2.74*   HGB 7.2* 7.9* 7.3*   HCT 23.7* 26.2* 23.3*   MCV 87.8 87.0 85.0   MCH 26.7* 26.2* 26.6*   MCHC 30.4* 30.2* 31.3*   RDW 16.7 17.0 16.8    410* 374   MPV 10.0 9.9 9.7       Recent Labs   Lab 04/23/25  0902 04/24/25  0543 04/25/25  0954 04/26/25  0333    137 141 141   K 3.7 3.8 3.7 3.8    105 108* 107   CO2 28 25 27 27   BUN 19.5* 15.1 7.6 11.2   CREATININE 0.86 1.01 0.94 0.97   CALCIUM 8.7 8.1* 8.6 8.2*   MG  --  2.00 2.20 2.10   ALBUMIN 3.0* 3.1* 3.3*  --    ALKPHOS 85 85 92  --    ALT 21 21 24  --    AST 15 22 22  --    BILITOT 0.2 0.5 0.3  --         Microbiology Results (last 7 days)       ** No results found for the last 168 hours. **             X-Ray Chest AP Portable  Narrative: EXAMINATION:  XR CHEST AP PORTABLE    CLINICAL HISTORY:  Chest Pain;    COMPARISON:  16 September 2021    FINDINGS:  Frontal view of the chest was obtained. Heart is mildly enlarged.  Grossly clear lungs.  No pneumothorax.  Impression: No acute findings.    Electronically signed by: Andrzej Becker  Date:    04/23/2025  Time:    09:42         Medication List        START taking these medications      ferrous sulfate 325 mg (65 mg iron) Tab tablet  Commonly known as: FEOSOL  Take 1 tablet (325 mg total) by mouth 2 (two) times daily.     senna-docusate 8.6-50 mg per tablet  Commonly known as: SENNA WITH DOCUSATE SODIUM  Take 2 tablets by mouth 2 (two) times a day.            CONTINUE taking these medications      amLODIPine 10 MG tablet  Commonly known as: NORVASC     losartan 25 MG tablet  Commonly known as: COZAAR      nebivoloL 2.5 MG Tab  Commonly known as: BYSTOLIC               Where to Get Your Medications        These medications were sent to [facility] - [address] [address]      Phone: [phone]   ferrous sulfate 325 mg (65 mg iron) Tab tablet  senna-docusate 8.6-50 mg per tablet          Explained in detail to the patient about the discharge plan, medications, and follow-up visits. Pt understands and agrees with the treatment plan  Discharge Disposition: Short Term Hospital   Discharged Condition: stable  Diet-   Dietary Orders (From admission, onward)       Start     Ordered    04/25/25 1015  Diet Adult Regular Standard Tray  Diet effective now        Question:  Tray type:  Answer:  Standard Tray    04/25/25 1015                   Medications Per DC med rec  Activities as tolerated   Follow-up Information       Shantel Barbour, NP Follow up in 1 week(s).    Specialty: Family Medicine  Contact information:  [address] [place] [address]  PED[facility]AT[facility]IC [facility] [address]  [phone]                           For further questions contact hospitalist office    Discharge time 33 minutes    For worsening symptoms, chest pain, shortness of breath, increased abdominal pain, high grade fever, stroke or stroke like symptoms, immediately go to the nearest Emergency Room or call 911 as soon as possible.      Grzegorz Bonilla M.D, on 4/26/2025. at 7:34 AM.

## 2025-04-28 LAB — PSYCHE PATHOLOGY RESULT: NORMAL

## 2025-04-28 NOTE — ANESTHESIA POSTPROCEDURE EVALUATION
Anesthesia Post Evaluation    Patient: Min Desir    Procedure(s) Performed: Procedure(s) (LRB):  DOUBLE (N/A)  COLON (N/A)  EGD, WITH CLOSED BIOPSY    Final Anesthesia Type: general      Patient location during evaluation: PACU  Patient participation: Yes- Able to Participate  Level of consciousness: awake and alert  Post-procedure vital signs: reviewed and stable  Pain management: adequate  Airway patency: patent    PONV status at discharge: No PONV  Anesthetic complications: no      Cardiovascular status: hemodynamically stable  Respiratory status: unassisted  Hydration status: euvolemic  Follow-up not needed.              Vitals Value Taken Time   /71 04/25/25 09:05   Temp 36.3 °C (97.3 °F) 04/25/25 08:35   Pulse 84 04/25/25 09:05   Resp 17 04/25/25 09:05   SpO2 100 % 04/25/25 09:05         No case tracking events are documented in the log.      Pain/Ermias Score: No data recorded

## 2025-05-12 ENCOUNTER — NURSE TRIAGE (OUTPATIENT)
Dept: ADMINISTRATIVE | Facility: CLINIC | Age: 45
End: 2025-05-12
Payer: COMMERCIAL

## 2025-05-12 ENCOUNTER — HOSPITAL ENCOUNTER (EMERGENCY)
Facility: HOSPITAL | Age: 45
Discharge: HOME | End: 2025-05-13
Attending: EMERGENCY MEDICINE
Payer: COMMERCIAL

## 2025-05-12 DIAGNOSIS — R07.9 CHEST PAIN: ICD-10-CM

## 2025-05-12 DIAGNOSIS — M79.606 LEG PAIN: ICD-10-CM

## 2025-05-12 DIAGNOSIS — M79.18 MUSCULOSKELETAL PAIN: Primary | ICD-10-CM

## 2025-05-12 DIAGNOSIS — M79.18 MYALGIA, MULTIPLE SITES: ICD-10-CM

## 2025-05-12 LAB
ALBUMIN SERPL-MCNC: 3.6 G/DL (ref 3.5–5)
ALBUMIN/GLOB SERPL: 1 RATIO (ref 1.1–2)
ALP SERPL-CCNC: 111 UNIT/L (ref 40–150)
ALT SERPL-CCNC: 18 UNIT/L (ref 0–55)
ANION GAP SERPL CALC-SCNC: 10 MEQ/L
AST SERPL-CCNC: 17 UNIT/L (ref 11–45)
BACTERIA #/AREA URNS AUTO: ABNORMAL /HPF
BASOPHILS # BLD AUTO: 0.06 X10(3)/MCL
BASOPHILS NFR BLD AUTO: 0.7 %
BILIRUB SERPL-MCNC: 0.2 MG/DL
BILIRUB UR QL STRIP.AUTO: NEGATIVE
BNP BLD-MCNC: <10 PG/ML
BUN SERPL-MCNC: 16.6 MG/DL (ref 7–18.7)
CALCIUM SERPL-MCNC: 9.4 MG/DL (ref 8.4–10.2)
CHLORIDE SERPL-SCNC: 107 MMOL/L (ref 98–107)
CLARITY UR: CLEAR
CO2 SERPL-SCNC: 25 MMOL/L (ref 22–29)
COLOR UR AUTO: ABNORMAL
CREAT SERPL-MCNC: 1.09 MG/DL (ref 0.55–1.02)
CREAT/UREA NIT SERPL: 15
EOSINOPHIL # BLD AUTO: 0.17 X10(3)/MCL (ref 0–0.9)
EOSINOPHIL NFR BLD AUTO: 2 %
ERYTHROCYTE [DISTWIDTH] IN BLOOD BY AUTOMATED COUNT: 15 % (ref 11.5–17)
GFR SERPLBLD CREATININE-BSD FMLA CKD-EPI: >60 ML/MIN/1.73/M2
GLOBULIN SER-MCNC: 3.6 GM/DL (ref 2.4–3.5)
GLUCOSE SERPL-MCNC: 137 MG/DL (ref 74–100)
GLUCOSE UR QL STRIP: NORMAL
HCT VFR BLD AUTO: 32.7 % (ref 37–47)
HGB BLD-MCNC: 10 G/DL (ref 12–16)
HGB UR QL STRIP: NEGATIVE
IMM GRANULOCYTES # BLD AUTO: 0.02 X10(3)/MCL (ref 0–0.04)
IMM GRANULOCYTES NFR BLD AUTO: 0.2 %
KETONES UR QL STRIP: NEGATIVE
LEUKOCYTE ESTERASE UR QL STRIP: NEGATIVE
LYMPHOCYTES # BLD AUTO: 2.95 X10(3)/MCL (ref 0.6–4.6)
LYMPHOCYTES NFR BLD AUTO: 35 %
MCH RBC QN AUTO: 26.4 PG (ref 27–31)
MCHC RBC AUTO-ENTMCNC: 30.6 G/DL (ref 33–36)
MCV RBC AUTO: 86.3 FL (ref 80–94)
MONOCYTES # BLD AUTO: 0.46 X10(3)/MCL (ref 0.1–1.3)
MONOCYTES NFR BLD AUTO: 5.5 %
MUCOUS THREADS URNS QL MICRO: ABNORMAL /LPF
NEUTROPHILS # BLD AUTO: 4.76 X10(3)/MCL (ref 2.1–9.2)
NEUTROPHILS NFR BLD AUTO: 56.6 %
NITRITE UR QL STRIP: NEGATIVE
NRBC BLD AUTO-RTO: 0 %
PH UR STRIP: 5.5 [PH]
PLATELET # BLD AUTO: 451 X10(3)/MCL (ref 130–400)
PMV BLD AUTO: 9.7 FL (ref 7.4–10.4)
POTASSIUM SERPL-SCNC: 4.1 MMOL/L (ref 3.5–5.1)
PROT SERPL-MCNC: 7.2 GM/DL (ref 6.4–8.3)
PROT UR QL STRIP: NEGATIVE
RBC # BLD AUTO: 3.79 X10(6)/MCL (ref 4.2–5.4)
RBC #/AREA URNS AUTO: ABNORMAL /HPF
SODIUM SERPL-SCNC: 142 MMOL/L (ref 136–145)
SP GR UR STRIP.AUTO: 1.02 (ref 1–1.03)
SQUAMOUS #/AREA URNS LPF: ABNORMAL /HPF
TROPONIN I SERPL-MCNC: <0.01 NG/ML (ref 0–0.04)
UROBILINOGEN UR STRIP-ACNC: NORMAL
WBC # BLD AUTO: 8.42 X10(3)/MCL (ref 4.5–11.5)
WBC #/AREA URNS AUTO: ABNORMAL /HPF

## 2025-05-12 PROCEDURE — 85025 COMPLETE CBC W/AUTO DIFF WBC: CPT | Performed by: NURSE PRACTITIONER

## 2025-05-12 PROCEDURE — 80053 COMPREHEN METABOLIC PANEL: CPT | Performed by: NURSE PRACTITIONER

## 2025-05-12 PROCEDURE — 99285 EMERGENCY DEPT VISIT HI MDM: CPT | Mod: 25

## 2025-05-12 PROCEDURE — 84484 ASSAY OF TROPONIN QUANT: CPT | Performed by: NURSE PRACTITIONER

## 2025-05-12 PROCEDURE — 83880 ASSAY OF NATRIURETIC PEPTIDE: CPT | Performed by: NURSE PRACTITIONER

## 2025-05-12 PROCEDURE — 81001 URINALYSIS AUTO W/SCOPE: CPT | Performed by: NURSE PRACTITIONER

## 2025-05-12 NOTE — TELEPHONE ENCOUNTER
"Pt calling in, had a blood transfusion on April 23rd and 4/26. Since then has been having soreness in bilateral arms. Still having pain in both arms, painful to touch. Denies swelling, redness, red streaks. Also having intermittent wheezing. Denies SOB at rest but does have SOB with activity. Denies CP. Dispo is ED now. Also states a tightness in her leg that had previously gone away but has now come back. Advised to let them know in the ED when she goes. Pt verbalizes understanding. Advised to call back with further concerns.    Reason for Disposition   [1] Age > 40 AND [2] no obvious cause AND [3] pain even when not moving the arm  (Exception: Pain is clearly made worse by moving arm or bending neck.)    Additional Information   Negative: Shock suspected (e.g., cold/pale/clammy skin, too weak to stand, low BP, rapid pulse)   Negative: [1] Similar pain previously AND [2] it was from "heart attack"   Negative: [1] Similar pain previously AND [2] it was from "angina" AND [3] not relieved by nitroglycerin   Negative: Sounds like a life-threatening emergency to the triager   Negative: Difficulty breathing or unusual sweating (e.g., sweating without exertion)   Negative: [1] Age > 40 AND [2] associated chest or jaw pain AND [3] pain lasts > 5 minutes    Protocols used: Arm Pain-A-AH    "

## 2025-05-12 NOTE — FIRST PROVIDER EVALUATION
Medical screening examination initiated.  I have conducted a focused provider triage encounter, findings are as follows:    Brief history of present illness:  Patient states chest pain and SOB. States bilateral calf pain. States that her symptoms started after a blood transfusion.     There were no vitals filed for this visit.    Pertinent physical exam:  Awake, alert, ambulatory      Brief workup plan:  Labs, EKG, Imaging      Preliminary workup initiated; this workup will be continued and followed by the physician or advanced practice provider that is assigned to the patient when roomed.

## 2025-05-13 VITALS
SYSTOLIC BLOOD PRESSURE: 145 MMHG | OXYGEN SATURATION: 97 % | WEIGHT: 264 LBS | HEART RATE: 83 BPM | BODY MASS INDEX: 51.83 KG/M2 | TEMPERATURE: 98 F | RESPIRATION RATE: 15 BRPM | HEIGHT: 60 IN | DIASTOLIC BLOOD PRESSURE: 105 MMHG

## 2025-05-13 LAB
OHS QRS DURATION: 82 MS
OHS QTC CALCULATION: 440 MS

## 2025-05-13 PROCEDURE — 93010 ELECTROCARDIOGRAM REPORT: CPT | Mod: ,,, | Performed by: INTERNAL MEDICINE

## 2025-05-13 PROCEDURE — 96372 THER/PROPH/DIAG INJ SC/IM: CPT | Performed by: EMERGENCY MEDICINE

## 2025-05-13 PROCEDURE — 93005 ELECTROCARDIOGRAM TRACING: CPT

## 2025-05-13 PROCEDURE — 63600175 PHARM REV CODE 636 W HCPCS: Mod: JZ,TB | Performed by: EMERGENCY MEDICINE

## 2025-05-13 RX ORDER — KETOROLAC TROMETHAMINE 30 MG/ML
30 INJECTION, SOLUTION INTRAMUSCULAR; INTRAVENOUS
Status: DISCONTINUED | OUTPATIENT
Start: 2025-05-13 | End: 2025-05-13

## 2025-05-13 RX ORDER — KETOROLAC TROMETHAMINE 30 MG/ML
30 INJECTION, SOLUTION INTRAMUSCULAR; INTRAVENOUS
Status: COMPLETED | OUTPATIENT
Start: 2025-05-13 | End: 2025-05-13

## 2025-05-13 RX ORDER — KETOROLAC TROMETHAMINE 10 MG/1
10 TABLET, FILM COATED ORAL EVERY 6 HOURS PRN
Qty: 20 TABLET | Refills: 0 | Status: SHIPPED | OUTPATIENT
Start: 2025-05-13 | End: 2025-05-13 | Stop reason: ALTCHOICE

## 2025-05-13 RX ORDER — HYDROCODONE BITARTRATE AND ACETAMINOPHEN 5; 325 MG/1; MG/1
1 TABLET ORAL EVERY 6 HOURS PRN
Qty: 10 TABLET | Refills: 0 | Status: SHIPPED | OUTPATIENT
Start: 2025-05-13

## 2025-05-13 RX ADMIN — KETOROLAC TROMETHAMINE 30 MG: 30 INJECTION, SOLUTION INTRAMUSCULAR; INTRAVENOUS at 02:05

## 2025-05-13 NOTE — ED PROVIDER NOTES
"Encounter Date: 5/12/2025    SCRIBE #1 NOTE: I, Butch Galvan, am scribing for, and in the presence of,  Lu Jewell MD. I have scribed the following portions of the note - Other sections scribed: HPI,ROS,PE.       History     Chief Complaint   Patient presents with    Medical Evaluation     Pt arrives POV and ambulatory in triage with C/O mildly CP and SOB since 4/23 after a blood administration on 4/23 as well as bilateral arm pain and bilateral calf pain. Pt stated "I feels like tightness and mildly tenderness to touch". Most recent blood transfusion was 4/26. Pt alert. GCS 15.      44 y/o female with PMHx of HTN presents to ED c/o bilateral arm pain onset 4/23. Pt reports she had a blood transfusion on onset, for an upper GI bleed. She reports since getting the transfusion, she has had bilateral arm pain. She also reports on 5/11, she started having bilateral leg pain. She denies any chest pain, shortness of breath, leg swelling, abdominal pain, nausea, vomiting, diarrhea, fever, or chills.     The history is provided by the patient and medical records.     Review of patient's allergies indicates:  No Known Allergies  Past Medical History:   Diagnosis Date    Hypertension     Sleep apnea      Past Surgical History:   Procedure Laterality Date    COLONOSCOPY N/A 4/25/2025    Procedure: COLON;  Surgeon: Bertrand Jmienez MD;  Location: [facility] ENDOSCOPY;  Service: Endoscopy;  Laterality: N/A;    EGD, WITH CLOSED BIOPSY  4/25/2025    Procedure: EGD, WITH CLOSED BIOPSY;  Surgeon: Bertrand Jimenez MD;  Location: [facility] ENDOSCOPY;  Service: Endoscopy;;    ESOPHAGOGASTRODUODENOSCOPY N/A 4/25/2025    Procedure: DOUBLE;  Surgeon: Bertrand Jimenez MD;  Location: [facility] ENDOSCOPY;  Service: Endoscopy;  Laterality: N/A;    HYSTERECTOMY       No family history on file.  Social History[1]  Review of Systems   Constitutional:  Negative for chills, diaphoresis and fever.   Respiratory:  Negative for " shortness of breath.    Cardiovascular:  Negative for chest pain and leg swelling.   Gastrointestinal:  Negative for abdominal pain, diarrhea, nausea and vomiting.   Musculoskeletal:  Positive for arthralgias (bilateral arm and leg pain) and myalgias (bilateral arm and leg pain). Negative for back pain and neck pain.       Physical Exam     Initial Vitals [05/12/25 1816]   BP Pulse Resp Temp SpO2   123/77 99 20 98.3 °F (36.8 °C) 96 %      MAP       --         Physical Exam    Nursing note and vitals reviewed.  Constitutional: She appears well-developed and well-nourished. She is not diaphoretic. She does not appear ill. No distress.   HENT:   Head: Normocephalic and atraumatic.   Right Ear: External ear normal.   Left Ear: External ear normal. Mouth/Throat: Oropharynx is clear and moist.   Eyes: Conjunctivae are normal.   Neck: Neck supple. No tracheal deviation present.   Cardiovascular:  Normal rate, regular rhythm and normal heart sounds.           No murmur heard.  Pulmonary/Chest: Breath sounds normal. No respiratory distress. She has no wheezes. She has no rhonchi. She has no rales.   Abdominal: Abdomen is soft. She exhibits no distension. There is no abdominal tenderness.   No right CVA tenderness.  No left CVA tenderness.   Musculoskeletal:         General: No tenderness. Normal range of motion.      Cervical back: Neck supple.     Neurological: She is alert and oriented to person, place, and time. GCS score is 15. GCS eye subscore is 4. GCS verbal subscore is 5. GCS motor subscore is 6.   Skin: Skin is warm and dry. Capillary refill takes less than 2 seconds. No rash noted. No pallor.         ED Course   Procedures  Labs Reviewed   COMPREHENSIVE METABOLIC PANEL - Abnormal       Result Value    Sodium 142      Potassium 4.1      Chloride 107      CO2 25      Glucose 137 (*)     Blood Urea Nitrogen 16.6      Creatinine 1.09 (*)     Calcium 9.4      Protein Total 7.2      Albumin 3.6      Globulin 3.6 (*)      Albumin/Globulin Ratio 1.0 (*)     Bilirubin Total 0.2            ALT 18      AST 17      eGFR >60      Anion Gap 10.0      BUN/Creatinine Ratio 15     URINALYSIS, REFLEX TO URINE CULTURE - Abnormal    Color, UA Light-Yellow      Appearance, UA Clear      Specific Gravity, UA 1.022      pH, UA 5.5      Protein, UA Negative      Glucose, UA Normal      Ketones, UA Negative      Blood, UA Negative      Bilirubin, UA Negative      Urobilinogen, UA Normal      Nitrites, UA Negative      Leukocyte Esterase, UA Negative      RBC, UA 0-5      WBC, UA 0-5      Bacteria, UA None Seen      Squamous Epithelial Cells, UA Trace      Mucous, UA Trace (*)    CBC WITH DIFFERENTIAL - Abnormal    WBC 8.42      RBC 3.79 (*)     Hgb 10.0 (*)     Hct 32.7 (*)     MCV 86.3      MCH 26.4 (*)     MCHC 30.6 (*)     RDW 15.0      Platelet 451 (*)     MPV 9.7      Neut % 56.6      Lymph % 35.0      Mono % 5.5      Eos % 2.0      Basophil % 0.7      Imm Grans % 0.2      Neut # 4.76      Lymph # 2.95      Mono # 0.46      Eos # 0.17      Baso # 0.06      Imm Gran # 0.02      NRBC% 0.0     B-TYPE NATRIURETIC PEPTIDE - Normal    Natriuretic Peptide <10.0     TROPONIN I - Normal    Troponin-I <0.010     CBC W/ AUTO DIFFERENTIAL    Narrative:     The following orders were created for panel order CBC Auto Differential.  Procedure                               Abnormality         Status                     ---------                               -----------         ------                     CBC with Differential[[id number]]       Abnormal            Final result                 Please view results for these tests on the individual orders.     EKG Readings: (Independently Interpreted)   Initial Reading: No STEMI. Rhythm: Normal Sinus Rhythm. Heart Rate: 78. Ectopy: No Ectopy. Conduction: Normal. ST Segments: Normal ST Segments. T Waves: Normal. Axis: Normal.       Imaging Results              X-Ray Chest PA And Lateral (Final result)  Result  time 05/12/25 18:57:57      Final result by Andrzej Becker MD (05/12/25 18:57:57)                   Impression:      No acute cardiopulmonary abnormality.      Electronically signed by: Andrzej Becker  Date:    05/12/2025  Time:    18:57               Narrative:    EXAMINATION:  XR CHEST PA AND LATERAL    CLINICAL HISTORY:  chest pain;    COMPARISON:  23 April 2025    FINDINGS:  PA and lateral views of the chest were obtained. Heart and mediastinum within normal limits. The lungs are clear. No pneumothorax or significant effusion.                                       Medications   ketorolac injection 30 mg (30 mg Intramuscular Given 5/13/25 0221)     Medical Decision Making  The differential diagnosis includes, but is not limited to anemia, renal failure, msk pain    Labs normal  Improved with toradol IV  Due to GI bleed, will rx short course of norco rather than nsaids  Follow up with PCP     Problems Addressed:  Chest pain: acute illness or injury that poses a threat to life or bodily functions  Leg pain: acute illness or injury that poses a threat to life or bodily functions  Musculoskeletal pain: acute illness or injury that poses a threat to life or bodily functions  Myalgia, multiple sites: acute illness or injury that poses a threat to life or bodily functions    Amount and/or Complexity of Data Reviewed  External Data Reviewed: notes.     Details: Admission 4/23/25 for UGIB with anemia requiring blood transfusion   Labs: ordered. Decision-making details documented in ED Course.  Radiology: ordered. Decision-making details documented in ED Course.  ECG/medicine tests: ordered and independent interpretation performed. Decision-making details documented in ED Course.    Risk  Prescription drug management.            Scribe Attestation:   Scribe #1: I performed the above scribed service and the documentation accurately describes the services I performed. I attest to the accuracy of the note.    Attending  Attestation:           Physician Attestation for Scribe:  Physician Attestation Statement for Scribe #1: I, Lu Jewell MD, reviewed documentation, as scribed by Butch Galvan in my presence, and it is both accurate and complete.                                    Clinical Impression:  Final diagnoses:  [R07.9] Chest pain  [M79.606] Leg pain  [M79.18] Musculoskeletal pain (Primary)  [M79.18] Myalgia, multiple sites          ED Disposition Condition    Discharge Stable          ED Prescriptions       Medication Sig Dispense Start Date End Date Auth. Provider    ketorolac (TORADOL) 10 mg tablet  (Status: Discontinued) Take 1 tablet (10 mg total) by mouth every 6 (six) hours as needed for Pain. 20 tablet 5/13/2025 5/13/2025 Lu Jewell MD    HYDROcodone-acetaminophen (NORCO) 5-325 mg per tablet Take 1 tablet by mouth every 6 (six) hours as needed for Pain. 10 tablet 5/13/2025 -- Lu Jewell MD          Follow-up Information       Follow up With Specialties Details Why Contact Info    Shantel Barbour, NP Family Medicine Schedule an appointment as soon as possible for a visit   [address] [facility] [address] [phone]      [facility] - Emergency Dept Emergency Medicine  As needed, If symptoms worsen [facility] [facility] [id number] [phone]                 [1]         Lu Jewell MD  05/13/25 7279

## 2025-08-29 DIAGNOSIS — Z12.31 ENCOUNTER FOR SCREENING MAMMOGRAM FOR MALIGNANT NEOPLASM OF BREAST: Primary | ICD-10-CM

## (undated) DEVICE — CONTAINER SPECIMEN SCREW 4OZ

## (undated) DEVICE — SYRINGE 0.9% NACL 10MIL PREFIL

## (undated) DEVICE — BAG LABGUARD BIOHAZARD 6X9IN

## (undated) DEVICE — FORCEP ALLIGATOR 2.8MM W/NDL

## (undated) DEVICE — LINER MEDI-VAC PPV FLTR 1500CC

## (undated) DEVICE — CABLE EKG DL RBBN 3 LEAD DISP

## (undated) DEVICE — SOL IRRI STRL WATER 1000ML

## (undated) DEVICE — CONTAINER FORMALIN PREFILLED

## (undated) DEVICE — KIT SURGICAL COLON .25 1.1OZ

## (undated) DEVICE — MANIFOLD 4 PORT

## (undated) DEVICE — BLOCK BLOX BITE DENT RIM 54FR

## (undated) DEVICE — TIP SUCTION YANKAUER